# Patient Record
Sex: FEMALE | Race: WHITE | Employment: OTHER | URBAN - METROPOLITAN AREA
[De-identification: names, ages, dates, MRNs, and addresses within clinical notes are randomized per-mention and may not be internally consistent; named-entity substitution may affect disease eponyms.]

---

## 2017-01-10 ENCOUNTER — TRANSFERRED RECORDS (OUTPATIENT)
Dept: HEALTH INFORMATION MANAGEMENT | Facility: CLINIC | Age: 67
End: 2017-01-10

## 2017-01-10 LAB
ALT SERPL-CCNC: 28 U/L (ref 6–29)
AST SERPL-CCNC: 22 U/L (ref 10–35)
CHOLEST SERPL-MCNC: 240 MG/DL (ref 125–200)
CREAT SERPL-MCNC: 0.8 MG/DL (ref 0.5–0.99)
GFR SERPL CREATININE-BSD FRML MDRD: 77 ML/MIN/1.73M2
GLUCOSE SERPL-MCNC: 82 MG/DL (ref 65–99)
HDLC SERPL-MCNC: 83 MG/DL
LDLC SERPL CALC-MCNC: 128 MG/DL
NONHDLC SERPL-MCNC: 157 MG/DL
POTASSIUM SERPL-SCNC: 4.5 MMOL/L (ref 3.5–5.3)
TRIGL SERPL-MCNC: 143 MG/DL

## 2017-02-14 ENCOUNTER — OFFICE VISIT (OUTPATIENT)
Dept: FAMILY MEDICINE | Facility: CLINIC | Age: 67
End: 2017-02-14
Payer: COMMERCIAL

## 2017-02-14 VITALS
WEIGHT: 175 LBS | HEART RATE: 88 BPM | HEIGHT: 64 IN | BODY MASS INDEX: 29.88 KG/M2 | DIASTOLIC BLOOD PRESSURE: 64 MMHG | SYSTOLIC BLOOD PRESSURE: 124 MMHG | TEMPERATURE: 97.5 F | RESPIRATION RATE: 16 BRPM

## 2017-02-14 DIAGNOSIS — E78.2 MIXED HYPERLIPIDEMIA: ICD-10-CM

## 2017-02-14 DIAGNOSIS — N89.8 VAGINAL DISCHARGE: ICD-10-CM

## 2017-02-14 DIAGNOSIS — R30.9 PAIN WITH URINATION: ICD-10-CM

## 2017-02-14 DIAGNOSIS — N39.0 URINARY TRACT INFECTION WITHOUT HEMATURIA, SITE UNSPECIFIED: Primary | ICD-10-CM

## 2017-02-14 LAB
ALBUMIN UR-MCNC: NEGATIVE MG/DL
APPEARANCE UR: CLEAR
BACTERIA #/AREA URNS HPF: ABNORMAL /HPF
BILIRUB UR QL STRIP: NEGATIVE
COLOR UR AUTO: YELLOW
GLUCOSE UR STRIP-MCNC: NEGATIVE MG/DL
HGB UR QL STRIP: ABNORMAL
KETONES UR STRIP-MCNC: NEGATIVE MG/DL
LEUKOCYTE ESTERASE UR QL STRIP: ABNORMAL
MICRO REPORT STATUS: NORMAL
NITRATE UR QL: NEGATIVE
NON-SQ EPI CELLS #/AREA URNS LPF: ABNORMAL /LPF
PH UR STRIP: 7 PH (ref 5–7)
RBC #/AREA URNS AUTO: ABNORMAL /HPF (ref 0–2)
SP GR UR STRIP: 1.01 (ref 1–1.03)
SPECIMEN SOURCE: NORMAL
URN SPEC COLLECT METH UR: ABNORMAL
UROBILINOGEN UR STRIP-ACNC: 0.2 EU/DL (ref 0.2–1)
WBC #/AREA URNS AUTO: ABNORMAL /HPF (ref 0–2)
WET PREP SPEC: NORMAL

## 2017-02-14 PROCEDURE — 87210 SMEAR WET MOUNT SALINE/INK: CPT | Performed by: FAMILY MEDICINE

## 2017-02-14 PROCEDURE — 81001 URINALYSIS AUTO W/SCOPE: CPT | Performed by: FAMILY MEDICINE

## 2017-02-14 PROCEDURE — 99214 OFFICE O/P EST MOD 30 MIN: CPT | Performed by: FAMILY MEDICINE

## 2017-02-14 RX ORDER — CIPROFLOXACIN 500 MG/1
500 TABLET, FILM COATED ORAL 2 TIMES DAILY
Qty: 10 TABLET | Refills: 0 | Status: SHIPPED | OUTPATIENT
Start: 2017-02-14 | End: 2017-02-19

## 2017-02-14 NOTE — PROGRESS NOTES
SUBJECTIVE:                                                    Glory Rodriguez is a 66 year old female who presents to clinic today for the following health issues:      URINARY TRACT SYMPTOMS     Onset: Saturday    Description:   Painful urination (Dysuria): YES- pain at the end of voiding  Blood in urine (Hematuria): no   Delay in urine (Hesitency): no     Intensity: mild    Progression of Symptoms:  worsening    Accompanying Signs & Symptoms:  Fever/chills: no   Flank pain no   Nausea and vomiting: no   Any vaginal symptoms: vaginal itching  Abdominal/Pelvic Pain: no    History:   History of frequent UTI's: no   History of kidney stones: no   Sexually Active: YES  Possibility of pregnancy: No    Precipitating factors:   none         Therapies Tried and outcome:   Increase fluid intake         Problem list and histories reviewed & adjusted, as indicated.  Additional history: as documented    Patient Active Problem List   Diagnosis     Other and unspecified malignant neoplasm of scalp and skin of neck     Benign neoplasm of skin of other and unspecified parts of face     Mixed hyperlipidemia     Urinary frequency     HYPERLIPIDEMIA LDL GOAL <130     Past Surgical History   Procedure Laterality Date     Surgical history of -        s/p T & A     Surgical history of -   114869     D & C     Surgical history of -        Colonoscopy     Surgical history of -   10/72     Excision Bartholian Cyst, lt.       Social History   Substance Use Topics     Smoking status: Never Smoker     Smokeless tobacco: Never Used     Alcohol use Yes      Comment: occasionally     Family History   Problem Relation Age of Onset     Hypertension Mother      Hypertension Father      Prostate Cancer Father       of     Cancer - colorectal Brother      and rectal/ from     CANCER Sister      adrenal and then liver and lung/         Current Outpatient Prescriptions   Medication Sig Dispense Refill     VITAMIN D,  "CHOLECALCIFEROL, PO Take 2,000 Units by mouth daily       ciprofloxacin (CIPRO) 500 MG tablet Take 1 tablet (500 mg) by mouth 2 times daily for 5 days 10 tablet 0     simvastatin (ZOCOR) 20 MG tablet Take 1 tablet (20 mg) by mouth daily 90 tablet 3     Flaxseed, Linseed, 1000 MG CAPS Take  by mouth.       FISH OIL None Entered       MULTI-VITAMIN OR TABS None Entered       ASPIRIN 81 MG OR TABS 1 TABLET DAILY       CALCIUM + D OR 500mg bid       No Known Allergies  Recent Labs   Lab Test 01/07/14 02/28/12 02/02/11   LDL  117  116  93   HDL  87  90  80   TRIG  129  137  88   ALT  30  25  37   CR  0.89  0.81  0.87   GFRESTIMATED  69  78   --    GFRESTBLACK  81   --    --    POTASSIUM  5.0  4.6  5.0      BP Readings from Last 3 Encounters:   02/14/17 124/64   04/04/16 120/67   07/09/14 130/81    Wt Readings from Last 3 Encounters:   02/14/17 175 lb (79.4 kg)   04/04/16 172 lb (78 kg)   07/09/14 166 lb (75.3 kg)                    ROS:  Constitutional, HEENT, cardiovascular, pulmonary, gi and gu systems are negative, except as otherwise noted.    OBJECTIVE:                                                    /64 (BP Location: Right arm, Patient Position: Chair, Cuff Size: Adult Regular)  Pulse 88  Temp 97.5  F (36.4  C)  Resp 16  Ht 5' 4\" (1.626 m)  Wt 175 lb (79.4 kg)  BMI 30.04 kg/m2  Body mass index is 30.04 kg/(m^2).  GENERAL: healthy, alert and no distress  NECK: no adenopathy, no asymmetry, masses, or scars and thyroid normal to palpation  RESP: lungs clear to auscultation - no rales, rhonchi or wheezes  CV: regular rate and rhythm, normal S1 S2, no S3 or S4, no murmur, click or rub, no peripheral edema and peripheral pulses strong  ABDOMEN: soft, nontender, no hepatosplenomegaly, no masses and bowel sounds normal  MS: no gross musculoskeletal defects noted, no edema         ASSESSMENT/PLAN:                                                    ASSESSMENT / PLAN:  (N39.0) Urinary tract infection without " hematuria, site unspecified  (primary encounter diagnosis)  Comment: has been worsening with dysuria, will have her to try abx  Plan: ciprofloxacin (CIPRO) 500 MG tablet            (N89.8) Vaginal discharge  Comment: intermittent itching, wet prep is normal today  Plan: Wet prep, Urine Microscopic, CANCELED: *UA         reflex to Microscopic            (R30.9) Pain with urination  Comment: mentioned above   Plan: *UA reflex to Microscopic and Culture         ([facility] (except         [place] and [place])            (E78.2) Mixed hyperlipidemia  Comment: has been stable with current dose of meds, has lab results outside showed normal LDL and TG, will keep her on the current dose of meds, will refill as needed for her request . She may not need to RTC for med check at the next refill    Plan: mentioned above       Manuel Villatoro MD  [facility] [place]

## 2017-02-14 NOTE — NURSING NOTE
"Chief Complaint   Patient presents with     URI     Vaginal Problem       Initial /64 (BP Location: Right arm, Patient Position: Chair, Cuff Size: Adult Regular)  Pulse 88  Temp 97.5  F (36.4  C)  Resp 16  Ht 5' 4\" (1.626 m)  Wt 175 lb (79.4 kg)  BMI 30.04 kg/m2 Estimated body mass index is 30.04 kg/(m^2) as calculated from the following:    Height as of this encounter: 5' 4\" (1.626 m).    Weight as of this encounter: 175 lb (79.4 kg).  Medication Reconciliation: complete. JOANN Segal LPN      "

## 2017-02-14 NOTE — MR AVS SNAPSHOT
"              After Visit Summary   2017    Glory Rodriguez    MRN: [id number]           Patient Information     Date Of Birth          1950        Visit Information        Provider Department      2017 2:40 PM Manuel Villatoro MD [facility]        Today's Diagnoses     Urinary tract infection without hematuria, site unspecified    -  1    Vaginal discharge        Pain with urination        Mixed hyperlipidemia           Follow-ups after your visit        Who to contact     If you have questions or need follow up information about today's clinic visit or your schedule please contact [facility] directly at [phone].  Normal or non-critical lab and imaging results will be communicated to you by Fashionspacehart, letter or phone within 4 business days after the clinic has received the results. If you do not hear from us within 7 days, please contact the clinic through Fashionspacehart or phone. If you have a critical or abnormal lab result, we will notify you by phone as soon as possible.  Submit refill requests through Coin or call your pharmacy and they will forward the refill request to us. Please allow 3 business days for your refill to be completed.          Additional Information About Your Visit        MyChart Information     Coin lets you send messages to your doctor, view your test results, renew your prescriptions, schedule appointments and more. To sign up, go to [email or web address] . Click on \"Log in\" on the left side of the screen, which will take you to the Welcome page. Then click on \"Sign up Now\" on the right side of the page.     You will be asked to enter the access code listed below, as well as some personal information. Please follow the directions to create your username and password.     Your access code is: [email or web address]  Expires: 5/15/2017  3:42 PM     Your access code will  in 90 days. If you need help or a new code, please call your " "[facility] or [phone].        Care EveryWhere ID     This is your Care EveryWhere ID. This could be used by other organizations to access your [place] medical records  [id number]        Your Vitals Were     Pulse Temperature Respirations Height BMI (Body Mass Index)       88 97.5  F (36.4  C) 16 5' 4\" (1.626 m) 30.04 kg/m2        Blood Pressure from Last 3 Encounters:   02/14/17 124/64   04/04/16 120/67   07/09/14 130/81    Weight from Last 3 Encounters:   02/14/17 175 lb (79.4 kg)   04/04/16 172 lb (78 kg)   07/09/14 166 lb (75.3 kg)              We Performed the Following     *UA reflex to Microscopic and Culture ([facility] (except [place] and [place])     Urine Microscopic     Wet prep          Today's Medication Changes          These changes are accurate as of: 2/14/17  3:42 PM.  If you have any questions, ask your nurse or doctor.               Start taking these medicines.        Dose/Directions    ciprofloxacin 500 MG tablet   Commonly known as:  CIPRO   Used for:  Urinary tract infection without hematuria, site unspecified   Started by:  Manuel Villatoro MD        Dose:  500 mg   Take 1 tablet (500 mg) by mouth 2 times daily for 5 days   Quantity:  10 tablet   Refills:  0            Where to get your medicines      These medications were sent to [place] [address] [place] [address] [address]     Phone:  [phone]     ciprofloxacin 500 MG tablet                Primary Care Provider Office Phone # Fax #    Manuel Villatoro -[phone]       [address] [address]        Thank you!     Thank you for choosing [facility]  for your care. Our goal is always to provide you with excellent care. Hearing back from our patients is one way we can continue to improve our services. Please take a few minutes to complete the written survey that you may receive " in the mail after your visit with us. Thank you!             Your Updated Medication List - Protect others around you: Learn how to safely use, store and throw away your medicines at www.disposemymeds.org.          This list is accurate as of: 2/14/17  3:42 PM.  Always use your most recent med list.                   Brand Name Dispense Instructions for use    aspirin 81 MG tablet      1 TABLET DAILY       CALCIUM + D PO      500mg bid       ciprofloxacin 500 MG tablet    CIPRO    10 tablet    Take 1 tablet (500 mg) by mouth 2 times daily for 5 days       FISH OIL      None Entered       Flaxseed (Linseed) 1000 MG Caps      Take  by mouth.       Multi-vitamin Tabs tablet   Generic drug:  multivitamin, therapeutic with minerals      None Entered       simvastatin 20 MG tablet    ZOCOR    90 tablet    Take 1 tablet (20 mg) by mouth daily       VITAMIN D (CHOLECALCIFEROL) PO      Take 2,000 Units by mouth daily

## 2017-04-05 DIAGNOSIS — E78.2 MIXED HYPERLIPIDEMIA: Primary | ICD-10-CM

## 2017-04-05 NOTE — TELEPHONE ENCOUNTER
Simvastatin      Last Written Prescription Date: 4/8/16  Last Fill Quantity: 90, # refills: 3  Last Office Visit with [facility], [facility] or [facility] prescribing provider: 2/14/17       Lab Results   Component Value Date    CHOL 230 01/07/2014     Lab Results   Component Value Date    HDL 87 01/07/2014     Lab Results   Component Value Date     01/07/2014     Lab Results   Component Value Date    TRIG 129 01/07/2014     Lab Results   Component Value Date    CHOLHDLRATIO 2.8 05/24/2010

## 2017-04-06 RX ORDER — SIMVASTATIN 20 MG
TABLET ORAL
Qty: 90 TABLET | Refills: 3 | Status: SHIPPED | OUTPATIENT
Start: 2017-04-06 | End: 2018-04-07

## 2017-04-06 NOTE — TELEPHONE ENCOUNTER
Routing refill request to provider for review/approval because:  FLP has not been checked since 2014  Ana Washington RN   [facility] - Triage

## 2017-04-19 NOTE — TELEPHONE ENCOUNTER
Patient states that she had lab work done through her employer. She asked Dr. Villatoro at her LOV if the labs were what was needed for him to refill her medication.     She will bring over a copy this evening.     Informed patient that we will give copy to Dr. Villatoro. After Dr. Villatoro reviews, need to have scanned.     Carol Avendano RN

## 2017-04-20 NOTE — TELEPHONE ENCOUNTER
Med refilled.   Ana Washington RN   [facility] - Triage

## 2018-02-12 ENCOUNTER — TRANSFERRED RECORDS (OUTPATIENT)
Dept: HEALTH INFORMATION MANAGEMENT | Facility: CLINIC | Age: 68
End: 2018-02-12

## 2018-02-12 LAB
ALT SERPL-CCNC: 18 U/L (ref 6–29)
AST SERPL-CCNC: 16 U/L (ref 10–35)
CHOLEST SERPL-MCNC: 199 MG/DL
CREAT SERPL-MCNC: 0.85 MG/DL (ref 0.5–0.99)
GFR SERPL CREATININE-BSD FRML MDRD: 71 ML/MIN/1.73M2
GLUCOSE SERPL-MCNC: 96 MG/DL (ref 65–99)
HDLC SERPL-MCNC: 71 MG/DL
LDLC SERPL CALC-MCNC: 104 MG/DL
NONHDLC SERPL-MCNC: 128 MG/DL
POTASSIUM SERPL-SCNC: 4.6 MMOL/L (ref 3.5–5.3)
TRIGL SERPL-MCNC: 147 MG/DL

## 2018-02-13 ENCOUNTER — TELEPHONE (OUTPATIENT)
Dept: FAMILY MEDICINE | Facility: CLINIC | Age: 68
End: 2018-02-13

## 2018-05-06 DIAGNOSIS — E78.2 MIXED HYPERLIPIDEMIA: ICD-10-CM

## 2018-05-07 RX ORDER — SIMVASTATIN 20 MG
TABLET ORAL
Qty: 30 TABLET | Refills: 0 | Status: SHIPPED | OUTPATIENT
Start: 2018-05-07 | End: 2018-06-19

## 2018-05-07 NOTE — TELEPHONE ENCOUNTER
Routing refill request to provider for review/approval because:  Amy given x1 and patient did not follow up, please advise: patient was notified of need for appointment over the phone.  Labs not current:  Lipids  Patient needs to be seen because it has been more than 1 year since last office visit.  Matilda Mendoza RN - Triage  [facility]

## 2018-05-07 NOTE — TELEPHONE ENCOUNTER
"Requested Prescriptions   Pending Prescriptions Disp Refills     simvastatin (ZOCOR) 20 MG tablet [Pharmacy Med Name: SIMVASTATIN 20 MG TABLET]  Last Written Prescription Date:  4-9-2018  Last Fill Quantity: 30 tablet,  # refills: 0   Last office visit: 2/14/2017 with prescribing provider:  2-  Future Office Visit:     30 tablet 0     Sig: TAKE 1 TABLET BY MOUTH DAILY, NEEDS APPOINTMENT FOR FURTHER REFILLS    Statins Protocol Failed    5/6/2018  8:08 AM       Failed - LDL on file in past 12 months    Recent Labs   Lab Test 01/10/17   LDL  128            Failed - Recent (12 mo) or future (30 days) visit within the authorizing provider's specialty    Patient had office visit in the last 12 months or has a visit in the next 30 days with authorizing provider or within the authorizing provider's specialty.  See \"Patient Info\" tab in inbasket, or \"Choose Columns\" in Meds & Orders section of the refill encounter.           Passed - No abnormal creatine kinase in past 12 months    No lab results found.            Passed - Patient is age 18 or older       Passed - No active pregnancy on record       Passed - No positive pregnancy test in past 12 months            "

## 2018-06-05 ENCOUNTER — TRANSFERRED RECORDS (OUTPATIENT)
Dept: HEALTH INFORMATION MANAGEMENT | Facility: CLINIC | Age: 68
End: 2018-06-05

## 2018-06-18 ENCOUNTER — TRANSFERRED RECORDS (OUTPATIENT)
Dept: HEALTH INFORMATION MANAGEMENT | Facility: CLINIC | Age: 68
End: 2018-06-18

## 2018-06-19 ENCOUNTER — OFFICE VISIT (OUTPATIENT)
Dept: FAMILY MEDICINE | Facility: CLINIC | Age: 68
End: 2018-06-19
Payer: COMMERCIAL

## 2018-06-19 VITALS
BODY MASS INDEX: 28.34 KG/M2 | HEART RATE: 66 BPM | HEIGHT: 64 IN | OXYGEN SATURATION: 99 % | DIASTOLIC BLOOD PRESSURE: 81 MMHG | SYSTOLIC BLOOD PRESSURE: 129 MMHG | TEMPERATURE: 96.9 F | RESPIRATION RATE: 12 BRPM | WEIGHT: 166 LBS

## 2018-06-19 DIAGNOSIS — M25.521 RIGHT ELBOW PAIN: ICD-10-CM

## 2018-06-19 DIAGNOSIS — E78.2 MIXED HYPERLIPIDEMIA: Primary | ICD-10-CM

## 2018-06-19 PROCEDURE — 99214 OFFICE O/P EST MOD 30 MIN: CPT | Performed by: FAMILY MEDICINE

## 2018-06-19 RX ORDER — SIMVASTATIN 20 MG
TABLET ORAL
Qty: 90 TABLET | Refills: 3 | Status: SHIPPED | OUTPATIENT
Start: 2018-06-19 | End: 2019-06-18

## 2018-06-19 NOTE — PROGRESS NOTES
SUBJECTIVE:   Glory Rodriguez is a 68 year old female who presents to clinic today for the following health issues:      Hyperlipidemia Follow-Up      Rate your low fat/cholesterol diet?: fair    Taking statin?  Yes, no muscle aches from statin    Other lipid medications/supplements?:  none      Amount of exercise or physical activity: 2-3 days/week for an average of 30-45 minutes    Problems taking medications regularly: No    Medication side effects: none    Diet: regular (no restrictions)        ED/UC Followup:    Facility:  [address] [place] [address]   Date of visit: 18  Reason for visit: right elbow injury, fall   Current Status: needs to follow up, not sure what the next step is          Problem list and histories reviewed & adjusted, as indicated.  Additional history: as documented    Patient Active Problem List   Diagnosis     Other and unspecified malignant neoplasm of scalp and skin of neck     Benign neoplasm of skin of other and unspecified parts of face     Mixed hyperlipidemia     Urinary frequency     HYPERLIPIDEMIA LDL GOAL <130     Past Surgical History:   Procedure Laterality Date     SURGICAL HISTORY OF -       s/p T & A     SURGICAL HISTORY OF -   [id number]    D & C     SURGICAL HISTORY OF -       Colonoscopy     SURGICAL HISTORY OF -   10/72    Excision Bartholian Cyst, lt.       Social History   Substance Use Topics     Smoking status: Never Smoker     Smokeless tobacco: Never Used     Alcohol use Yes      Comment: occasionally     Family History   Problem Relation Age of Onset     Hypertension Mother      Hypertension Father      Prostate Cancer Father       of     Cancer - colorectal Brother      and rectal/ from     Cancer Sister      adrenal and then liver and lung/         Current Outpatient Prescriptions   Medication Sig Dispense Refill     ASPIRIN 81 MG OR TABS 1 TABLET DAILY       CALCIUM + D OR 500mg bid       FISH OIL None Entered       Flaxseed, Linseed, 1000 MG  "CAPS Take  by mouth.       MULTI-VITAMIN OR TABS None Entered       simvastatin (ZOCOR) 20 MG tablet TAKE 1 TABLET BY MOUTH DAILY, NEEDS APPOINTMENT FOR FURTHER REFILLS 90 tablet 3     VITAMIN D, CHOLECALCIFEROL, PO Take 2,000 Units by mouth daily       [DISCONTINUED] simvastatin (ZOCOR) 20 MG tablet TAKE 1 TABLET BY MOUTH DAILY, NEEDS APPOINTMENT FOR FURTHER REFILLS 30 tablet 0     No Known Allergies  Recent Labs   Lab Test 01/10/17 01/07/14 02/28/12   LDL  128  117  116   HDL  83  87  90   TRIG  143  129  137   ALT  28  30  25   CR  0.80  0.89  0.81   GFRESTIMATED  77  69  78   GFRESTBLACK  90  81   --    POTASSIUM  4.5  5.0  4.6      BP Readings from Last 3 Encounters:   06/19/18 129/81   02/14/17 124/64   04/04/16 120/67    Wt Readings from Last 3 Encounters:   06/19/18 166 lb (75.3 kg)   02/14/17 175 lb (79.4 kg)   04/04/16 172 lb (78 kg)               Reviewed and updated as needed this visit by clinical staff  Allergies       Reviewed and updated as needed this visit by Provider         ROS:  Constitutional, HEENT, cardiovascular, pulmonary, gi and gu systems are negative, except as otherwise noted.    OBJECTIVE:     /81 (Cuff Size: Adult Regular)  Pulse 66  Temp 96.9  F (36.1  C) (Tympanic)  Resp 12  Ht 5' 4\" (1.626 m)  Wt 166 lb (75.3 kg)  SpO2 99%  BMI 28.49 kg/m2  Body mass index is 28.49 kg/(m^2).  GENERAL: healthy, alert and no distress  NECK: no adenopathy, no asymmetry, masses, or scars and thyroid normal to palpation  RESP: lungs clear to auscultation - no rales, rhonchi or wheezes  CV: regular rate and rhythm, normal S1 S2, no S3 or S4, no murmur, click or rub, no peripheral edema and peripheral pulses strong  ABDOMEN: soft, nontender, no hepatosplenomegaly, no masses and bowel sounds normal  MS: no gross musculoskeletal defects noted, no edema        ASSESSMENT/PLAN:   ASSESSMENT / PLAN:  (E78.2) Mixed hyperlipidemia  (primary encounter diagnosis)  Comment: has been stable with " current dose of meds, has no side effect from it, the lab test with her worker[employer] reviewed and all are stable  Will have her to keep on current dose of meds,    Plan: simvastatin (ZOCOR) 20 MG tablet        Recheck in 1 year    (M25.521) Right elbow pain  Comment: had fall with contusion on the right elbow 2 weeks ago, XR reading showed no fracture, but ER note showed medial epicondyle fracture    Plan: encouraged her to keep doing conservative management.       FUTURE APPOINTMENTS:       - Follow-up visit in 1 year for JESUS Villatoro MD  [facility]

## 2018-06-19 NOTE — MR AVS SNAPSHOT
"              After Visit Summary   6/19/2018    Glory Rodriguez    MRN: [id number]           Patient Information     Date Of Birth          1950        Visit Information        Provider Department      6/19/2018 10:20 AM Manuel Villatoro MD [facility]        Today's Diagnoses     Mixed hyperlipidemia    -  1    Right elbow pain           Follow-ups after your visit        Follow-up notes from your care team     Return in about 1 year (around 6/19/2019) for Physical Exam.      Who to contact     If you have questions or need follow up information about today's clinic visit or your schedule please contact [facility] [place] directly at [phone].  Normal or non-critical lab and imaging results will be communicated to you by MyChart, letter or phone within 4 business days after the clinic has received the results. If you do not hear from us within 7 days, please contact the clinic through MyChart or phone. If you have a critical or abnormal lab result, we will notify you by phone as soon as possible.  Submit refill requests through Vivonet or call your pharmacy and they will forward the refill request to us. Please allow 3 business days for your refill to be completed.          Additional Information About Your Visit        Care EveryWhere ID     This is your Care EveryWhere ID. This could be used by other organizations to access your [facility] medical records  [id number]        Your Vitals Were     Pulse Temperature Respirations Height Pulse Oximetry BMI (Body Mass Index)    66 96.9  F (36.1  C) (Tympanic) 12 5' 4\" (1.626 m) 99% 28.49 kg/m2       Blood Pressure from Last 3 Encounters:   06/19/18 129/81   02/14/17 124/64   04/04/16 120/67    Weight from Last 3 Encounters:   06/19/18 166 lb (75.3 kg)   02/14/17 175 lb (79.4 kg)   04/04/16 172 lb (78 kg)              Today, you had the following     No orders found for display         Where to get your medicines      These " medications were sent to [facility] [address] [facility] - [place], [place] - [address]  [address] [address]     Phone:  [phone]     simvastatin 20 MG tablet          Primary Care Provider Office Phone # Fax #    Manuel Villatoro -[phone]       [address] [facility] [address]        Equal Access to Services     LINDA SALGUERO : Hadii aad ku hadasho Soomaali, waaxda luqadaha, qaybta kaalmada adeegyada, waxay idiin hayaan adeeg kharash la'aan ah. So [facility] [phone].    ATENCIÓN: Si habla español, tiene a mendoza disposición servicios gratuitos de asistencia lingüística. Llame al [phone].    We comply with applicable federal civil rights laws and [place] laws. We do not discriminate on the basis of race, color, national origin, age, disability, sex, sexual orientation, or gender identity.            Thank you!     Thank you for choosing [facility]  for your care. Our goal is always to provide you with excellent care. Hearing back from our patients is one way we can continue to improve our services. Please take a few minutes to complete the written survey that you may receive in the mail after your visit with us. Thank you!             Your Updated Medication List - Protect others around you: Learn how to safely use, store and throw away your medicines at www.disposemymeds.org.          This list is accurate as of 6/19/18 10:47 AM.  Always use your most recent med list.                   Brand Name Dispense Instructions for use Diagnosis    aspirin 81 MG tablet      1 TABLET DAILY        CALCIUM + D PO      500mg bid        FISH OIL      None Entered        Flaxseed (Linseed) 1000 MG Caps      Take  by mouth.        Multi-vitamin Tabs tablet   Generic drug:  multivitamin, therapeutic with minerals      None Entered        simvastatin 20 MG tablet    ZOCOR    90 tablet    TAKE 1 TABLET BY MOUTH DAILY, NEEDS APPOINTMENT FOR FURTHER REFILLS    Mixed  hyperlipidemia       VITAMIN D (CHOLECALCIFEROL) PO      Take 2,000 Units by mouth daily

## 2019-03-11 ENCOUNTER — TRANSFERRED RECORDS (OUTPATIENT)
Dept: HEALTH INFORMATION MANAGEMENT | Facility: CLINIC | Age: 69
End: 2019-03-11

## 2019-03-11 LAB
ALT SERPL-CCNC: 21 U/L (ref 6–29)
AST SERPL-CCNC: 18 U/L (ref 10–35)
CHOLEST SERPL-MCNC: 196 MG/DL
CREAT SERPL-MCNC: 0.82 MG/DL (ref 0.5–0.99)
GFR SERPL CREATININE-BSD FRML MDRD: 74 ML/MIN/1.73M2
GLUCOSE SERPL-MCNC: 89 MG/DL (ref 65–99)
HDLC SERPL-MCNC: 79 MG/DL
LDLC SERPL CALC-MCNC: 94 MG/DL
NONHDLC SERPL-MCNC: 117 MG/DL
POTASSIUM SERPL-SCNC: 4.2 MMOL/L (ref 3.5–5.3)
TRIGL SERPL-MCNC: 132 MG/DL

## 2019-06-25 ENCOUNTER — OFFICE VISIT (OUTPATIENT)
Dept: FAMILY MEDICINE | Facility: CLINIC | Age: 69
End: 2019-06-25
Payer: MEDICARE

## 2019-06-25 VITALS
HEIGHT: 64 IN | TEMPERATURE: 97.8 F | RESPIRATION RATE: 14 BRPM | BODY MASS INDEX: 29.53 KG/M2 | WEIGHT: 173 LBS | OXYGEN SATURATION: 100 % | DIASTOLIC BLOOD PRESSURE: 84 MMHG | SYSTOLIC BLOOD PRESSURE: 126 MMHG | HEART RATE: 66 BPM

## 2019-06-25 DIAGNOSIS — E78.2 MIXED HYPERLIPIDEMIA: ICD-10-CM

## 2019-06-25 PROCEDURE — 99213 OFFICE O/P EST LOW 20 MIN: CPT | Performed by: FAMILY MEDICINE

## 2019-06-25 RX ORDER — SIMVASTATIN 20 MG
TABLET ORAL
Qty: 90 TABLET | Refills: 3 | Status: SHIPPED | OUTPATIENT
Start: 2019-06-25 | End: 2020-07-13

## 2019-06-25 ASSESSMENT — MIFFLIN-ST. JEOR: SCORE: 1299.72

## 2019-06-25 NOTE — PROGRESS NOTES
Subjective     Glory Rodriguez is a 69 year old female who presents to clinic today for the following health issues:    HPI   Hyperlipidemia Follow-Up      Are you having any of the following symptoms? (Select all that apply)  No complaints of shortness of breath, chest pain or pressure.  No increased sweating or nausea with activity.  No left-sided neck or arm pain.  No complaints of pain in calves when walking 1-2 blocks.    Are you regularly taking any medication or supplement to lower your cholesterol?  YEs    Are you having muscle aches or other side effects that you think could be caused by your cholesterol lowering medication?  No        Amount of exercise or physical activity: 1 day/week for an average of less than 15 minutes    Problems taking medications regularly: No    Medication side effects: none    Diet: regular (no restrictions)      Patient Active Problem List   Diagnosis     Other and unspecified malignant neoplasm of scalp and skin of neck     Benign neoplasm of skin of other and unspecified parts of face     Mixed hyperlipidemia     Urinary frequency     HYPERLIPIDEMIA LDL GOAL <130     Past Surgical History:   Procedure Laterality Date     SURGICAL HISTORY OF -       s/p T & A     SURGICAL HISTORY OF -   857171    D & C     SURGICAL HISTORY OF -       Colonoscopy     SURGICAL HISTORY OF -   10/72    Excision Bartholian Cyst, lt.       Social History     Tobacco Use     Smoking status: Never Smoker     Smokeless tobacco: Never Used   Substance Use Topics     Alcohol use: Yes     Comment: occasionally     Family History   Problem Relation Age of Onset     Hypertension Mother      Hypertension Father      Prostate Cancer Father          of     Cancer - colorectal Brother         and rectal/ from     Cancer Sister         adrenal and then liver and lung/         Current Outpatient Medications   Medication Sig Dispense Refill     ASPIRIN 81 MG OR TABS 1 TABLET DAILY        "CALCIUM + D OR 500mg bid       FISH OIL None Entered       Flaxseed, Linseed, 1000 MG CAPS Take  by mouth.       MULTI-VITAMIN OR TABS None Entered       simvastatin (ZOCOR) 20 MG tablet TAKE 1 TABLET BY MOUTH DAILY. NEED APPOINTMENT FOR FURTHER REFILLS 90 tablet 3     VITAMIN D, CHOLECALCIFEROL, PO Take 2,000 Units by mouth daily       No Known Allergies  Recent Labs   Lab Test 02/12/18 01/10/17 01/07/14   * 128 117   HDL 71 83 87   TRIG 147 143 129   ALT 18 28 30   CR 0.85 0.80 0.89   GFRESTIMATED 71 77 69   GFRESTBLACK 83 90 81   POTASSIUM 4.6 4.5 5.0      BP Readings from Last 3 Encounters:   06/25/19 126/84   06/19/18 129/81   02/14/17 124/64    Wt Readings from Last 3 Encounters:   06/25/19 78.5 kg (173 lb)   06/19/18 75.3 kg (166 lb)   02/14/17 79.4 kg (175 lb)           Reviewed and updated as needed this visit by Provider         Review of Systems   ROS COMP: Constitutional, HEENT, cardiovascular, pulmonary, gi and gu systems are negative, except as otherwise noted.      Objective    /84 (Cuff Size: Adult Large)   Pulse 66   Temp 97.8  F (36.6  C) (Tympanic)   Resp 14   Ht 1.626 m (5' 4\")   Wt 78.5 kg (173 lb)   SpO2 100%   BMI 29.70 kg/m    Body mass index is 29.7 kg/m .  Physical Exam   GENERAL: healthy, alert and no distress  NECK: no adenopathy, no asymmetry, masses, or scars and thyroid normal to palpation  RESP: lungs clear to auscultation - no rales, rhonchi or wheezes  CV: regular rate and rhythm, normal S1 S2, no S3 or S4, no murmur, click or rub, no peripheral edema and peripheral pulses strong  ABDOMEN: soft, nontender, no hepatosplenomegaly, no masses and bowel sounds normal  MS: no gross musculoskeletal defects noted, no edema            Assessment & Plan       ICD-10-CM    1. Mixed hyperlipidemia E78.2 simvastatin (ZOCOR) 20 MG tablet   she has done a annual exam through her spouse's work, and results were reviewed, found no abnormal finding,   Will have her to keep taking " "current dose of meds, and keep working on life style modification for high BMI     BMI:   Estimated body mass index is 29.7 kg/m  as calculated from the following:    Height as of this encounter: 1.626 m (5' 4\").    Weight as of this encounter: 78.5 kg (173 lb).   Weight management plan: Discussed healthy diet and exercise guidelines        FUTURE APPOINTMENTS:       - Follow-up visit in 1 year      No follow-ups on file.    Manuel Villatoro MD  [facility]        "

## 2020-07-13 ENCOUNTER — VIRTUAL VISIT (OUTPATIENT)
Dept: FAMILY MEDICINE | Facility: CLINIC | Age: 70
End: 2020-07-13
Payer: COMMERCIAL

## 2020-07-13 DIAGNOSIS — E78.2 MIXED HYPERLIPIDEMIA: Primary | ICD-10-CM

## 2020-07-13 PROCEDURE — 99213 OFFICE O/P EST LOW 20 MIN: CPT | Mod: 95 | Performed by: FAMILY MEDICINE

## 2020-07-13 RX ORDER — MULTIVIT-MIN/IRON/FOLIC ACID/K 18-600-40
CAPSULE ORAL
COMMUNITY

## 2020-07-13 RX ORDER — SIMVASTATIN 20 MG
TABLET ORAL
Qty: 90 TABLET | Refills: 3 | Status: SHIPPED | OUTPATIENT
Start: 2020-07-13 | End: 2021-07-05

## 2020-07-13 NOTE — PROGRESS NOTES
"Glory Rodriguez is a 70 year old female who is being evaluated via a billable telephone visit.      The patient has been notified of following:     \"This telephone visit will be conducted via a call between you and your physician/provider. We have found that certain health care needs can be provided without the need for a physical exam.  This service lets us provide the care you need with a short phone conversation.  If a prescription is necessary we can send it directly to your pharmacy.  If lab work is needed we can place an order for that and you can then stop by our lab to have the test done at a later time.    Telephone visits are billed at different rates depending on your insurance coverage. During this emergency period, for some insurers they may be billed the same as an in-person visit.  Please reach out to your insurance provider with any questions.    If during the course of the call the physician/provider feels a telephone visit is not appropriate, you will not be charged for this service.\"    Patient has given verbal consent for Telephone visit?  Yes    What phone number would you like to be contacted at? [phone]    How would you like to obtain your AVS? Mail a copy    Subjective     Glory Rodriguez is a 70 year old female who presents via phone visit today for the following health issues:    HPI  Hyperlipidemia Follow-Up      Are you regularly taking any medication or supplement to lower your cholesterol?   Yes- Simvastatin    Are you having muscle aches or other side effects that you think could be caused by your cholesterol lowering medication?  No      How many servings of fruits and vegetables do you eat daily?  2-3    On average, how many sweetened beverages do you drink each day (Examples: soda, juice, sweet tea, etc.  Do NOT count diet or artificially sweetened beverages)?   2 -- creamer in coffee    How many days per week do you exercise enough to make your heart beat faster? " 2 - 1 mile    How many minutes a day do you exercise enough to make your heart beat faster? 9 or less    How many days per week do you miss taking your medication? 0        Patient Active Problem List   Diagnosis     Other and unspecified malignant neoplasm of scalp and skin of neck     Benign neoplasm of skin of other and unspecified parts of face     Mixed hyperlipidemia     Urinary frequency     HYPERLIPIDEMIA LDL GOAL <130     Past Surgical History:   Procedure Laterality Date     SURGICAL HISTORY OF -       s/p T & A     SURGICAL HISTORY OF -   1022    D & C     SURGICAL HISTORY OF -       Colonoscopy     SURGICAL HISTORY OF -   10/72    Excision Bartholian Cyst, lt.       Social History     Tobacco Use     Smoking status: Never Smoker     Smokeless tobacco: Never Used   Substance Use Topics     Alcohol use: Yes     Comment: occasionally     Family History   Problem Relation Age of Onset     Hypertension Mother      Hypertension Father      Prostate Cancer Father          of     Cancer - colorectal Brother         and rectal/ from     Cancer Sister         adrenal and then liver and lung/         Current Outpatient Medications   Medication Sig Dispense Refill     Ascorbic Acid (VITAMIN C) 500 MG CAPS        ASPIRIN 81 MG OR TABS 1 TABLET DAILY       CALCIUM + D OR 500mg bid       ELDERBERRY PO Take 1,000 mg by mouth       FISH OIL None Entered       Flaxseed, Linseed, 1000 MG CAPS Take  by mouth.       MULTI-VITAMIN OR TABS None Entered       simvastatin (ZOCOR) 20 MG tablet TAKE 1 TABLET BY MOUTH DAILY. NEED APPOINTMENT FOR FURTHER REFILLS 90 tablet 3     VITAMIN D, CHOLECALCIFEROL, PO Take 2,000 Units by mouth daily       No Known Allergies  Recent Labs   Lab Test 03/11/19 02/12/18 01/10/17   LDL 94 104* 128   HDL 79 71 83   TRIG 132 147 143   ALT 21 18 28   CR 0.82 0.85 0.80   GFRESTIMATED 74 71 77   GFRESTBLACK 85 83 90   POTASSIUM 4.2 4.6 4.5      BP Readings from Last 3 Encounters:    06/25/19 126/84   06/19/18 129/81   02/14/17 124/64    Wt Readings from Last 3 Encounters:   06/25/19 78.5 kg (173 lb)   06/19/18 75.3 kg (166 lb)   02/14/17 79.4 kg (175 lb)                    Reviewed and updated as needed this visit by Provider         Review of Systems   Constitutional, HEENT, cardiovascular, pulmonary, gi and gu systems are negative, except as otherwise noted.       Objective   Reported vitals:  There were no vitals taken for this visit.   healthy, alert and no distress  PSYCH: Alert and oriented times 3; coherent speech, normal   rate and volume, able to articulate logical thoughts, able   to abstract reason, no tangential thoughts, no hallucinations   or delusions  Her affect is normal  RESP: No cough, no audible wheezing, able to talk in full sentences  Remainder of exam unable to be completed due to telephone visits    Diagnostic Test Results:  Labs reviewed in Epic        Assessment/Plan:  1. Mixed hyperlipidemia  Stable with current dose of meds, has no side effect from the meds, will keep monitoring and recheck in 1 year   - CBC with platelets and differential; Future  - Comprehensive metabolic panel (BMP + Alb, Alk Phos, ALT, AST, Total. Bili, TP); Future  - Lipid panel reflex to direct LDL Fasting; Future  - simvastatin (ZOCOR) 20 MG tablet; TAKE 1 TABLET BY MOUTH DAILY. NEED APPOINTMENT FOR FURTHER REFILLS  Dispense: 90 tablet; Refill: 3    No follow-ups on file.      Phone call duration:  13 minutes    Manuel Villatoro MD

## 2020-09-11 ENCOUNTER — TRANSFERRED RECORDS (OUTPATIENT)
Dept: HEALTH INFORMATION MANAGEMENT | Facility: CLINIC | Age: 70
End: 2020-09-11

## 2020-09-11 ENCOUNTER — RECORDS - HEALTHEAST (OUTPATIENT)
Dept: ADMINISTRATIVE | Facility: OTHER | Age: 70
End: 2020-09-11

## 2020-09-11 LAB
ALT SERPL W/O P-5'-P-CCNC: 32 U/L (ref 14–63)
ALT SERPL-CCNC: 32 U/L (ref 14–63)
AST SERPL-CCNC: 23 U/L (ref 15–37)
AST SERPL-CCNC: 23 U/L (ref 15–37)
CHOLEST SERPL-MCNC: 238 MG/DL (ref 100–200)
CHOLEST SERPL-MCNC: 238 MG/DL (ref 100–200)
CREAT SERPL-MCNC: 0.91 MG/DL (ref 0.51–0.95)
CREAT SERPL-MCNC: 0.91 MG/DL (ref 0.51–0.95)
GFR ESTIMATE EXT - HISTORICAL: >60 ML/MIN/1.73M2 (ref 60–150)
GFR SERPL CREATININE-BSD FRML MDRD: >60 ML/MIN/1.73M 2 (ref 60–150)
GLUCOSE SERPL-MCNC: 98 MG/DL (ref 74–100)
HDLC SERPL-MCNC: 81 MG/DL (ref 45–60)
HDLC SERPL-MCNC: 81 MG/DL (ref 45–60)
LDLC SERPL CALC-MCNC: 137 MG/DL (ref 1–129)
LDLC SERPL CALC-MCNC: 137 MG/DL (ref 1–129)
NON HDL CHOL. (LDL+VLDL): 157 MG/DL (ref 0–130)
NONHDLC SERPL-MCNC: 157 MG/DL (ref 0–130)
POTASSIUM SERPL-SCNC: 4.3 MMOL/L (ref 3.5–5.1)
TRIGL SERPL-MCNC: 101 MG/DL (ref 35–150)
TRIGLYCERIDES (HISTORICAL CONVERSION): 101 MG/DL (ref 35–150)

## 2020-09-30 ENCOUNTER — RECORDS - HEALTHEAST (OUTPATIENT)
Dept: HEALTH INFORMATION MANAGEMENT | Facility: CLINIC | Age: 70
End: 2020-09-30

## 2021-04-04 ENCOUNTER — HEALTH MAINTENANCE LETTER (OUTPATIENT)
Age: 71
End: 2021-04-04

## 2021-05-30 ENCOUNTER — HEALTH MAINTENANCE LETTER (OUTPATIENT)
Age: 71
End: 2021-05-30

## 2021-06-18 ENCOUNTER — NURSE TRIAGE (OUTPATIENT)
Dept: FAMILY MEDICINE | Facility: CLINIC | Age: 71
End: 2021-06-18

## 2021-06-18 ENCOUNTER — OFFICE VISIT (OUTPATIENT)
Dept: FAMILY MEDICINE | Facility: CLINIC | Age: 71
End: 2021-06-18
Payer: COMMERCIAL

## 2021-06-18 VITALS
BODY MASS INDEX: 29.19 KG/M2 | WEIGHT: 171 LBS | DIASTOLIC BLOOD PRESSURE: 88 MMHG | HEART RATE: 64 BPM | SYSTOLIC BLOOD PRESSURE: 144 MMHG | HEIGHT: 64 IN | OXYGEN SATURATION: 98 % | TEMPERATURE: 97.8 F

## 2021-06-18 DIAGNOSIS — M77.8 TRICEPS TENDINITIS: ICD-10-CM

## 2021-06-18 DIAGNOSIS — M75.21 BICEPS TENDONITIS, RIGHT: Primary | ICD-10-CM

## 2021-06-18 PROCEDURE — 99213 OFFICE O/P EST LOW 20 MIN: CPT | Performed by: FAMILY MEDICINE

## 2021-06-18 ASSESSMENT — MIFFLIN-ST. JEOR: SCORE: 1280.65

## 2021-06-18 ASSESSMENT — PAIN SCALES - GENERAL: PAINLEVEL: NO PAIN (0)

## 2021-06-18 NOTE — PROGRESS NOTES
"    Assessment & Plan     Biceps tendonitis, right  Started after planting last week, has numbness and tingling on forearm and hand, has normal strength on hand and arm  Will have her to try NSAIDS and ice as needed for next 1-2 weeks     Triceps tendinitis  Mentioned above            BMI:   Estimated body mass index is 29.35 kg/m  as calculated from the following:    Height as of this encounter: 1.626 m (5' 4\").    Weight as of this encounter: 77.6 kg (171 lb).   Weight management plan: Discussed healthy diet and exercise guidelines    FUTURE APPOINTMENTS:       - Follow-up visit in 3 months for CPE    No follow-ups on file.    Manuel Villatoro MD  [facility] DARY Holder is a 71 year old who presents for the following health issues     HPI     Musculoskeletal problem/pain  Onset/Duration: since Sunday  Description  Location: hand, wrist and elbow - right  Joint Swelling: no  Redness: no  Pain: YES- numbness tingling  Warmth: no  Intensity:  moderate  Progression of Symptoms:  improving  Accompanying signs and symptoms:   Fevers: no  Numbness/tingling/weakness: YES  History  Trauma to the area: no  Recent illness:  no  Previous similar problem: no  Previous evaluation:  no  Precipitating or alleviating factors:  Aggravating factors include: none  Therapies tried and outcome: nothing        Review of Systems   Constitutional, HEENT, cardiovascular, pulmonary, gi and gu systems are negative, except as otherwise noted.      Objective    BP (!) 144/88   Pulse 64   Temp 97.8  F (36.6  C) (Tympanic)   Ht 1.626 m (5' 4\")   Wt 77.6 kg (171 lb)   SpO2 98%   BMI 29.35 kg/m    Body mass index is 29.35 kg/m .  Physical Exam   GENERAL: healthy, alert and no distress  NECK: no adenopathy, no asymmetry, masses, or scars and thyroid normal to palpation  RESP: lungs clear to auscultation - no rales, rhonchi or wheezes  CV: regular rate and rhythm, normal S1 S2, no S3 or S4, no murmur, click " or rub, no peripheral edema and peripheral pulses strong  ABDOMEN: soft, nontender, no hepatosplenomegaly, no masses and bowel sounds normal  MS: no gross musculoskeletal defects noted, no edema

## 2021-06-18 NOTE — TELEPHONE ENCOUNTER
"Call transferred from Central scheduling     Pt states that she has had tingling/pain in her RIGHT arm since Sunday 6-13-21.    Denies weakness or difficulty using arm. Denies dizziness, headache, changes in vision or any neurological symptoms. Denies any head or bodily injury.     Advised pt to be seen in clinic right away. Scheduled to see Dr. Villatoro at 11:20 to be evaluated. Pt  will drive her to appt. Pt agreeable to plan.       Reason for Disposition    Tingling (e.g., pins and needles) of the face, arm or leg on one side of the body, that is  present now (Exceptions: chronic/recurrent symptom lasting > 4 weeks or tingling from known cause, such as: bumped elbow, carpal tunnel syndrome, pinched nerve, frostbite)    Additional Information    Negative: Difficult to awaken or acting confused (e.g., disoriented, slurred speech)    Negative: New neurologic deficit that is present NOW, sudden onset of ANY of the following: * Weakness of the face, arm, or leg on one side of the body* Numbness of the face, arm, or leg on one side of the body* Loss of speech or garbled speech    Negative: Sounds like a life-threatening emergency to the triager    Negative: Confusion, disorientation, or hallucinations is the main symptom    Negative: Dizziness is the main symptom    Negative: Followed a head injury within last 3 days    Negative: Headache (with neurologic deficit)    Negative: Unable to urinate (or only a few drops) and bladder feels very full    Negative: Loss of control of bowel or bladder (i.e., incontinence) of new onset    Negative: Back pain with numbness (loss of sensation) in groin or rectal area    Negative: Patient sounds very sick or weak to the triager    Answer Assessment - Initial Assessment Questions  1. SYMPTOM: \"What is the main symptom you are concerned about?\" (e.g., weakness, numbness)      Tingling/pain in right arm since Sunday afternoon.  2. ONSET: \"When did this start?\" (minutes, hours, days; " "while sleeping)      Sunday afternoon.   3. LAST NORMAL: \"When was the last time you were normal (no symptoms)?\"      Rj morning  4. PATTERN \"Does this come and go, or has it been constant since it started?\"  \"Is it present now?\"      Constant, is having it now.   5. CARDIAC SYMPTOMS: \"Have you had any of the following symptoms: chest pain, difficulty breathing, palpitations?\"      no  6. NEUROLOGIC SYMPTOMS: \"Have you had any of the following symptoms: headache, dizziness, vision loss, double vision, changes in speech, unsteady on your feet?\"      no  7. OTHER SYMPTOMS: \"Do you have any other symptoms?\"      No  8. PREGNANCY: \"Is there any chance you are pregnant?\" \"When was your last menstrual period?\"      NA    Protocols used: NEUROLOGIC DEFICIT-A-[place]    Sameera Vanessa RN    "

## 2021-07-05 DIAGNOSIS — E78.2 MIXED HYPERLIPIDEMIA: ICD-10-CM

## 2021-07-05 RX ORDER — SIMVASTATIN 20 MG
TABLET ORAL
Qty: 90 TABLET | Refills: 0 | Status: SHIPPED | OUTPATIENT
Start: 2021-07-05 | End: 2021-09-28

## 2021-09-19 ENCOUNTER — HEALTH MAINTENANCE LETTER (OUTPATIENT)
Age: 71
End: 2021-09-19

## 2021-09-26 DIAGNOSIS — E78.2 MIXED HYPERLIPIDEMIA: ICD-10-CM

## 2021-09-26 NOTE — LETTER
October 13, 2021      Glory Rodriguez  [address] [address]        Dear Glory,       Our records indicates that it is time for you to be seen for an office visit with Dr. Villatoro.    Please call our office at [phone] to schedule an appointment for follow up .     Please disregard this notice if you have already made an appointment.    If you are no longer a [facility] patient; Please contact us and let us know that as well. You will also need to the let the pharmacy know the name of your current Provider so that they can send future request to them.       Sincerely,    [facility] [place] Staff

## 2021-09-28 RX ORDER — SIMVASTATIN 20 MG
TABLET ORAL
Qty: 30 TABLET | Refills: 0 | Status: SHIPPED | OUTPATIENT
Start: 2021-09-28 | End: 2021-10-29

## 2021-09-28 NOTE — TELEPHONE ENCOUNTER
Medication is being filled for 1 time refill only due to:  due for med check and labs.   Emily DAVID RN  EP Triage

## 2021-11-23 DIAGNOSIS — E78.2 MIXED HYPERLIPIDEMIA: ICD-10-CM

## 2021-11-23 NOTE — LETTER
December 1, 2021      Glory Rodriguez  [address] [address]        Dear Glory,       Our records indicates that it is time for you to be seen for an office visit with Dr. Villatoro.    Please call our office at [phone] to schedule an appointment for physical with fasting labs and med check.      Please disregard this notice if you have already made an appointment.    If you are no longer a [facility] patient; Please contact us and let us know that as well. You will also need to the let the pharmacy know the name of your current Provider so that they can send future request to them.       Sincerely,    [place] [facility] [place] Staff

## 2021-11-24 RX ORDER — SIMVASTATIN 20 MG
TABLET ORAL
Qty: 15 TABLET | Refills: 0 | Status: SHIPPED | OUTPATIENT
Start: 2021-11-24 | End: 2021-12-06

## 2021-11-24 NOTE — TELEPHONE ENCOUNTER
Routing refill request to provider for review/approval because:  Labs not current:  MARCIO DAVID RN  EP Triage

## 2021-12-05 DIAGNOSIS — E78.2 MIXED HYPERLIPIDEMIA: ICD-10-CM

## 2021-12-06 RX ORDER — SIMVASTATIN 20 MG
TABLET ORAL
Qty: 15 TABLET | Refills: 0 | Status: SHIPPED | OUTPATIENT
Start: 2021-12-06 | End: 2021-12-17

## 2021-12-15 DIAGNOSIS — E78.2 MIXED HYPERLIPIDEMIA: ICD-10-CM

## 2021-12-16 NOTE — TELEPHONE ENCOUNTER
Failed protocol.  please route to  team if patient needs an appointment     Lucrecia MADRIGALRN BSN  [facility]  [phone]

## 2021-12-17 RX ORDER — SIMVASTATIN 20 MG
TABLET ORAL
Qty: 15 TABLET | Refills: 0 | Status: SHIPPED | OUTPATIENT
Start: 2021-12-17 | End: 2021-12-31

## 2021-12-28 ENCOUNTER — TRANSFERRED RECORDS (OUTPATIENT)
Dept: HEALTH INFORMATION MANAGEMENT | Facility: CLINIC | Age: 71
End: 2021-12-28
Payer: COMMERCIAL

## 2021-12-28 LAB
ALT SERPL-CCNC: 20 U/L (ref 6–29)
AST SERPL-CCNC: 18 U/L (ref 10–35)
CHOLESTEROL (EXTERNAL): 235 MG/DL
CREATININE (EXTERNAL): 0.88 MG/DL (ref 0.6–0.93)
GFR ESTIMATED (EXTERNAL): 67 ML/MIN/1.73M2
GFR ESTIMATED (IF AFRICAN AMERICAN) (EXTERNAL): 77 ML/MIN/1.73M2
GLUCOSE (EXTERNAL): 85 MG/DL (ref 65–99)
HDLC SERPL-MCNC: 84 MG/DL
LDL CHOLESTEROL (EXTERNAL): 123 MG/DL
NON HDL CHOLESTEROL (EXTERNAL): 151 MG/DL
POTASSIUM (EXTERNAL): 4.6 MMOL/L (ref 3.5–5.3)
TRIGLYCERIDES (EXTERNAL): 168 MG/DL

## 2021-12-29 DIAGNOSIS — E78.2 MIXED HYPERLIPIDEMIA: ICD-10-CM

## 2021-12-30 NOTE — TELEPHONE ENCOUNTER
Failed protocol.  please route to  team if patient needs an appointment     Lucrecia MADRIGALRN BSN  [facility]  [phone]

## 2021-12-31 RX ORDER — SIMVASTATIN 20 MG
TABLET ORAL
Qty: 10 TABLET | Refills: 0 | Status: SHIPPED | OUTPATIENT
Start: 2021-12-31 | End: 2022-02-15

## 2022-02-15 DIAGNOSIS — E78.2 MIXED HYPERLIPIDEMIA: ICD-10-CM

## 2022-02-15 RX ORDER — SIMVASTATIN 20 MG
TABLET ORAL
Qty: 90 TABLET | Refills: 3 | Status: SHIPPED | OUTPATIENT
Start: 2022-02-15 | End: 2023-02-13

## 2022-02-16 NOTE — TELEPHONE ENCOUNTER
Patient Contact     Called pt and relayed Dr. Villatoro's message, see below. She stated her understanding.     Mara ALFONSO RN  [facility]

## 2022-02-17 ENCOUNTER — TELEPHONE (OUTPATIENT)
Dept: FAMILY MEDICINE | Facility: CLINIC | Age: 72
End: 2022-02-17
Payer: COMMERCIAL

## 2022-04-30 ENCOUNTER — HEALTH MAINTENANCE LETTER (OUTPATIENT)
Age: 72
End: 2022-04-30

## 2022-05-23 ENCOUNTER — TRANSFERRED RECORDS (OUTPATIENT)
Dept: HEALTH INFORMATION MANAGEMENT | Facility: CLINIC | Age: 72
End: 2022-05-23
Payer: COMMERCIAL

## 2022-11-20 ENCOUNTER — HEALTH MAINTENANCE LETTER (OUTPATIENT)
Age: 72
End: 2022-11-20

## 2023-04-12 ENCOUNTER — OFFICE VISIT (OUTPATIENT)
Dept: FAMILY MEDICINE | Facility: CLINIC | Age: 73
End: 2023-04-12
Payer: COMMERCIAL

## 2023-04-12 VITALS
WEIGHT: 169.5 LBS | TEMPERATURE: 97.2 F | HEIGHT: 65 IN | BODY MASS INDEX: 28.24 KG/M2 | DIASTOLIC BLOOD PRESSURE: 74 MMHG | RESPIRATION RATE: 16 BRPM | OXYGEN SATURATION: 99 % | SYSTOLIC BLOOD PRESSURE: 132 MMHG | HEART RATE: 73 BPM

## 2023-04-12 DIAGNOSIS — M81.0 AGE RELATED OSTEOPOROSIS, UNSPECIFIED PATHOLOGICAL FRACTURE PRESENCE: ICD-10-CM

## 2023-04-12 DIAGNOSIS — R51.9 TEMPORAL HEADACHE: ICD-10-CM

## 2023-04-12 DIAGNOSIS — Z00.01 ENCOUNTER FOR GENERAL ADULT MEDICAL EXAMINATION WITH ABNORMAL FINDINGS: Primary | ICD-10-CM

## 2023-04-12 DIAGNOSIS — E78.2 MIXED HYPERLIPIDEMIA: ICD-10-CM

## 2023-04-12 LAB
ALBUMIN SERPL BCG-MCNC: 4.3 G/DL (ref 3.5–5.2)
ALP SERPL-CCNC: 62 U/L (ref 35–104)
ALT SERPL W P-5'-P-CCNC: 23 U/L (ref 10–35)
ANION GAP SERPL CALCULATED.3IONS-SCNC: 14 MMOL/L (ref 7–15)
AST SERPL W P-5'-P-CCNC: 25 U/L (ref 10–35)
BILIRUB SERPL-MCNC: 0.6 MG/DL
BUN SERPL-MCNC: 14.9 MG/DL (ref 8–23)
CALCIUM SERPL-MCNC: 9.7 MG/DL (ref 8.8–10.2)
CHLORIDE SERPL-SCNC: 103 MMOL/L (ref 98–107)
CHOLEST SERPL-MCNC: 227 MG/DL
CREAT SERPL-MCNC: 0.96 MG/DL (ref 0.51–0.95)
DEPRECATED HCO3 PLAS-SCNC: 23 MMOL/L (ref 22–29)
ERYTHROCYTE [DISTWIDTH] IN BLOOD BY AUTOMATED COUNT: 12.3 % (ref 10–15)
GFR SERPL CREATININE-BSD FRML MDRD: 63 ML/MIN/1.73M2
GLUCOSE SERPL-MCNC: 97 MG/DL (ref 70–99)
HCT VFR BLD AUTO: 39.7 % (ref 35–47)
HDLC SERPL-MCNC: 93 MG/DL
HGB BLD-MCNC: 13.4 G/DL (ref 11.7–15.7)
LDLC SERPL CALC-MCNC: 116 MG/DL
MCH RBC QN AUTO: 30.7 PG (ref 26.5–33)
MCHC RBC AUTO-ENTMCNC: 33.8 G/DL (ref 31.5–36.5)
MCV RBC AUTO: 91 FL (ref 78–100)
NONHDLC SERPL-MCNC: 134 MG/DL
PLATELET # BLD AUTO: 186 10E3/UL (ref 150–450)
POTASSIUM SERPL-SCNC: 4.3 MMOL/L (ref 3.4–5.3)
PROT SERPL-MCNC: 7 G/DL (ref 6.4–8.3)
RBC # BLD AUTO: 4.37 10E6/UL (ref 3.8–5.2)
SODIUM SERPL-SCNC: 140 MMOL/L (ref 136–145)
TRIGL SERPL-MCNC: 90 MG/DL
WBC # BLD AUTO: 6.2 10E3/UL (ref 4–11)

## 2023-04-12 PROCEDURE — 80053 COMPREHEN METABOLIC PANEL: CPT | Performed by: FAMILY MEDICINE

## 2023-04-12 PROCEDURE — 85027 COMPLETE CBC AUTOMATED: CPT | Performed by: FAMILY MEDICINE

## 2023-04-12 PROCEDURE — G0438 PPPS, INITIAL VISIT: HCPCS | Performed by: FAMILY MEDICINE

## 2023-04-12 PROCEDURE — 36415 COLL VENOUS BLD VENIPUNCTURE: CPT | Performed by: FAMILY MEDICINE

## 2023-04-12 PROCEDURE — 80061 LIPID PANEL: CPT | Performed by: FAMILY MEDICINE

## 2023-04-12 RX ORDER — SIMVASTATIN 20 MG
20 TABLET ORAL DAILY
Qty: 90 TABLET | Refills: 3 | Status: SHIPPED | OUTPATIENT
Start: 2023-04-12 | End: 2024-03-28

## 2023-04-12 ASSESSMENT — ENCOUNTER SYMPTOMS
FEVER: 0
NERVOUS/ANXIOUS: 0
CHILLS: 0
EYE PAIN: 0
COUGH: 0
DYSURIA: 0
ABDOMINAL PAIN: 0
FREQUENCY: 0
CONSTIPATION: 0
HEADACHES: 0
BREAST MASS: 0
DIARRHEA: 0
DIZZINESS: 0
HEMATOCHEZIA: 0
HEMATURIA: 0
ARTHRALGIAS: 0
SORE THROAT: 0
NAUSEA: 0
MYALGIAS: 0
PALPITATIONS: 0
PARESTHESIAS: 0
SHORTNESS OF BREATH: 0
JOINT SWELLING: 0
HEARTBURN: 0

## 2023-04-12 ASSESSMENT — ACTIVITIES OF DAILY LIVING (ADL): CURRENT_FUNCTION: NO ASSISTANCE NEEDED

## 2023-04-12 ASSESSMENT — PAIN SCALES - GENERAL: PAINLEVEL: NO PAIN (0)

## 2023-04-12 NOTE — PATIENT INSTRUCTIONS
Patient Education   Personalized Prevention Plan  You are due for the preventive services outlined below.  Your care team is available to assist you in scheduling these services.  If you have already completed any of these items, please share that information with your care team to update in your medical record.  Health Maintenance Due   Topic Date Due     Zoster (Shingles) Vaccine (1 of 2) Never done     Annual Wellness Visit  02/18/2016     Pneumococcal Vaccine (1 - PCV) Never done     ANNUAL REVIEW OF HM ORDERS  06/18/2022     Diptheria Tetanus Pertussis (DTAP/TDAP/TD) Vaccine (3 - Td or Tdap) 07/25/2022     Flu Vaccine (1) 09/01/2022     COVID-19 Vaccine (5 - Booster for Pfizer series) 09/08/2022     Osteoporosis Screening  01/22/2023        Patient Education   Personalized Prevention Plan  You are due for the preventive services outlined below.  Your care team is available to assist you in scheduling these services.  If you have already completed any of these items, please share that information with your care team to update in your medical record.  Health Maintenance Due   Topic Date Due     Zoster (Shingles) Vaccine (1 of 2) Never done     Pneumococcal Vaccine (1 - PCV) Never done     ANNUAL REVIEW OF HM ORDERS  06/18/2022     Diptheria Tetanus Pertussis (DTAP/TDAP/TD) Vaccine (3 - Td or Tdap) 07/25/2022     Flu Vaccine (1) 09/01/2022     COVID-19 Vaccine (5 - Booster for Pfizer series) 09/08/2022     Osteoporosis Screening  01/22/2023

## 2023-04-12 NOTE — PROGRESS NOTES
"   SUBJECTIVE:   CC: Glory is an 73 year old who presents for preventive health visit.       4/12/2023     2:08 PM   Additional Questions   Roomed by Sonja Reyes LPN   {Split Bill scripting  The purpose of this visit is to discuss your medical history and prevent health problems before you are sick. You may be responsible for a co-pay, coinsurance, or deductible if your visit today includes services such as checking on a sore throat, having an x-ray or lab test, or treating and evaluating a new or existing condition :544867}  {Patient advised of split billing (Optional):911299}  Healthy Habits:     In general, how would you rate your overall health?  Good    Frequency of exercise:  2-3 days/week    Do you usually eat at least 4 servings of fruit and vegetables a day, include whole grains    & fiber and avoid regularly eating high fat or \"junk\" foods?  Yes    Taking medications regularly:  Yes    Medication side effects:  None    Ability to successfully perform activities of daily living:  No assistance needed    Home Safety:  No safety concerns identified    Hearing Impairment:  No hearing concerns    In the past 6 months, have you been bothered by leaking of urine?  No    In general, how would you rate your overall mental or emotional health?  Good      PHQ-2 Total Score: 0    Additional concerns today:  No    {Add if <65 person on Medicare  - Required Questions (Optional):774914}  {Outside tests to abstract? :965818}    {additional problems to add (Optional):401075}    Today's PHQ-2 Score:       4/12/2023     2:05 PM   PHQ-2 ( 1999 Pfizer)   Q1: Little interest or pleasure in doing things 0   Q2: Feeling down, depressed or hopeless 0   PHQ-2 Score 0   Q1: Little interest or pleasure in doing things Not at all   Q2: Feeling down, depressed or hopeless Not at all   PHQ-2 Score 0           Social History     Tobacco Use     Smoking status: Never     Smokeless tobacco: Never   Vaping Use     Vaping status: Not on " "file   Substance Use Topics     Alcohol use: Yes     Comment: occasionally     {Rooming staff  Click this link to complete the Prescreen if response below is not for today's visit  Alcohol Use Prescreen >3 drinks/day or > 7 drinks/week.  If the prescreen question answer is YES, complete the full AUDIT  :938771}        4/12/2023     2:04 PM   Alcohol Use   Prescreen: >3 drinks/day or >7 drinks/week? No   {add AUDIT responses (Optional) (A score of 7 for adult men is an indication of hazardous drinking; a score of 8 or more is an indication of an alcohol use disorder.  A score of 7 or more for adult women is an indication of hazardous drinking or an alchohol use disorder):038905}  Reviewed orders with patient.  Reviewed health maintenance and updated orders accordingly - { :156772::\"Yes\"}  {Chronicprobdata (optional):571124}    Breast Cancer Screening:        History of abnormal Pap smear: { :578170}      4/20/2011    12:00 AM 11/28/2007    12:00 AM 11/21/2006    12:00 AM   PAP / HPV   PAP (Historical) NIL   NIL   NIL       Reviewed and updated as needed this visit by clinical staff                  Reviewed and updated as needed this visit by Provider                 {HISTORY OPTIONS (Optional):278515}    Review of Systems   Constitutional: Negative for chills and fever.   HENT: Negative for congestion, ear pain, hearing loss and sore throat.    Eyes: Negative for pain and visual disturbance.   Respiratory: Negative for cough and shortness of breath.    Cardiovascular: Negative for chest pain, palpitations and peripheral edema.   Gastrointestinal: Negative for abdominal pain, constipation, diarrhea, heartburn, hematochezia and nausea.   Breasts:  Negative for tenderness, breast mass and discharge.   Genitourinary: Positive for urgency. Negative for dysuria, frequency, genital sores, hematuria, pelvic pain, vaginal bleeding and vaginal discharge.   Musculoskeletal: Negative for arthralgias, joint swelling and " "myalgias.   Skin: Negative for rash.   Neurological: Negative for dizziness, headaches and paresthesias.   Psychiatric/Behavioral: Negative for mood changes. The patient is not nervous/anxious.      {FEMALE ROS (Optional):983664}     OBJECTIVE:   There were no vitals taken for this visit.  Physical Exam  {Exam Choices (Optional):645431}    {Diagnostic Test Results (Optional):947199::\"Diagnostic Test Results:\",\"Labs reviewed in Epic\"}    ASSESSMENT/PLAN:   {Diag Picklist:171227}    {Patient advised of split billing (Optional):047227}      COUNSELING:  {FEMALE COUNSELING MESSAGES:571558::\"Reviewed preventive health counseling, as reflected in patient instructions\"}        She reports that she has never smoked. She has never used smokeless tobacco.      {Counseling Resources  [place] Preventive Services Task Force  Cholesterol Screening  Health diet/nutrition  Pooled Cohorts Equation Calculator  [employer]'s MyPlate  ASA Prophylaxis  Lung CA Screening  Osteoporosis prevention/bone health :072102}  {Breast Cancer Risk Calculator  BRCA-Related Cancer Risk Assessment FHS-7 Tool :342408}  Manuel Villatoro MD  [facility]  "

## 2023-04-12 NOTE — PROGRESS NOTES
"SUBJECTIVE:   Glory is a 73 year old who presents for Preventive Visit.      4/12/2023     2:08 PM   Additional Questions   Roomed by Sonja Reyes LPN     Patient has been advised of split billing requirements and indicates understanding: Yes  Are you in the first 12 months of your Medicare coverage?  Yes,     Healthy Habits:     In general, how would you rate your overall health?  Good    Frequency of exercise:  2-3 days/week    Do you usually eat at least 4 servings of fruit and vegetables a day, include whole grains    & fiber and avoid regularly eating high fat or \"junk\" foods?  Yes    Taking medications regularly:  Yes    Medication side effects:  None    Ability to successfully perform activities of daily living:  No assistance needed    Home Safety:  No safety concerns identified    Hearing Impairment:  No hearing concerns    In the past 6 months, have you been bothered by leaking of urine?  No    In general, how would you rate your overall mental or emotional health?  Good      PHQ-2 Total Score: 0    Additional concerns today:  No      Have you ever done Advance Care Planning? (For example, a Health Directive, POLST, or a discussion with a medical provider or your loved ones about your wishes): Yes, advance care planning is on file.       Fall risk    Fallen 2 or more times in the past year?: No  Any fall with injury in the past year?: No    Cognitive Screening   1) Repeat 3 items (Leader, Season, Table)    2) Clock draw: NORMAL  3) 3 item recall: Recalls 3 objects  Results: NORMAL clock, 1-2 items recalled: COGNITIVE IMPAIRMENT LESS LIKELY    Mini-CogTM Copyright WALTER Henry. Licensed by the author for use in [facility]; reprinted with permission (rufina@.[facility]). All rights reserved.      Do you have sleep apnea, excessive snoring or daytime drowsiness?: no    Reviewed and updated as needed this visit by clinical staff    Allergies  Meds              Reviewed and updated as needed this visit by " Provider                 Social History     Tobacco Use     Smoking status: Not on file     Smokeless tobacco: Not on file   Vaping Use     Vaping status: Not on file   Substance Use Topics     Alcohol use: Yes     Comment: occasionally             4/12/2023     2:04 PM   Alcohol Use   Prescreen: >3 drinks/day or >7 drinks/week? No          View : No data to display.              Do you have a current opioid prescription? No  Do you use any other controlled substances or medications that are not prescribed by a provider? None      Current providers sharing in care for this patient include:   Patient Care Team:  Manuel Villatoro MD as PCP - General (Family Practice)  Manuel Villatoro MD as Assigned PCP    The following health maintenance items are reviewed in Epic and correct as of today:  Health Maintenance   Topic Date Due     ZOSTER IMMUNIZATION (1 of 2) Never done     MEDICARE ANNUAL WELLNESS VISIT  02/18/2016     Pneumococcal Vaccine: 65+ Years (1 - PCV) Never done     ANNUAL REVIEW OF HM ORDERS  06/18/2022     DTAP/TDAP/TD IMMUNIZATION (3 - Td or Tdap) 07/25/2022     INFLUENZA VACCINE (1) 09/01/2022     COVID-19 Vaccine (5 - Booster for Pfizer series) 09/08/2022     DEXA  01/22/2023     ADVANCE CARE PLANNING  06/19/2023     MAMMO SCREENING  12/28/2023     FALL RISK ASSESSMENT  04/12/2024     COLORECTAL CANCER SCREENING  04/01/2025     LIPID  12/28/2026     PHQ-2 (once per calendar year)  Completed     IPV IMMUNIZATION  Aged Out     MENINGITIS IMMUNIZATION  Aged Out     HEPATITIS C SCREENING  Discontinued     BP Readings from Last 3 Encounters:   04/12/23 132/74   06/18/21 (!) 144/88   06/25/19 126/84    Wt Readings from Last 3 Encounters:   04/12/23 76.9 kg (169 lb 8 oz)   06/18/21 77.6 kg (171 lb)   06/25/19 78.5 kg (173 lb)                  Patient Active Problem List   Diagnosis     Other and unspecified malignant neoplasm of scalp and skin of neck     Benign neoplasm of skin of other and unspecified parts of face      Mixed hyperlipidemia     Urinary frequency     HYPERLIPIDEMIA LDL GOAL <130     Past Surgical History:   Procedure Laterality Date     SURGICAL HISTORY OF -       s/p T & A     SURGICAL HISTORY OF -   706753    D & C     SURGICAL HISTORY OF -       Colonoscopy     SURGICAL HISTORY OF -   10/72    Excision Bartholian Cyst, lt.       Social History     Tobacco Use     Smoking status: Not on file     Smokeless tobacco: Not on file   Vaping Use     Vaping status: Not on file   Substance Use Topics     Alcohol use: Yes     Comment: occasionally     Family History   Problem Relation Age of Onset     Hypertension Mother      Hypertension Father      Prostate Cancer Father          of     Cancer - colorectal Brother         and rectal/ from     Cancer Sister         adrenal and then liver and lung/         Current Outpatient Medications   Medication Sig Dispense Refill     Ascorbic Acid (VITAMIN C) 500 MG CAPS        ASPIRIN 81 MG OR TABS 1 TABLET DAILY       CALCIUM + D OR 500mg bid       ELDERBERRY PO Take 1,000 mg by mouth       FISH OIL None Entered       Flaxseed, Linseed, 1000 MG CAPS Take  by mouth.       MULTI-VITAMIN OR TABS None Entered       simvastatin (ZOCOR) 20 MG tablet Take 1 tablet (20 mg) by mouth daily 90 tablet 3     VITAMIN D, CHOLECALCIFEROL, PO Take 2,000 Units by mouth daily       No Known Allergies  Recent Labs   Lab Test 21  0700 20  0000 19  0000 18  0000   LDL  --  137*  137* 94 104*   HDL  --  81* 79 71   TRIG 168* 101 132 147   ALT  --  32  32 21 18   CR  --  0.91  0.91 0.82 0.85   GFRESTIMATED  --  >60  >60 74 71   GFRESTBLACK  --   --  85 83   POTASSIUM  --  4.3 4.2 4.6                Review of Systems   Constitutional: Negative for chills and fever.   HENT: Negative for congestion, ear pain, hearing loss and sore throat.    Eyes: Negative for pain and visual disturbance.   Respiratory: Negative for cough and shortness of breath.   "  Cardiovascular: Negative for chest pain, palpitations and peripheral edema.   Gastrointestinal: Negative for abdominal pain, constipation, diarrhea, heartburn, hematochezia and nausea.   Breasts:  Negative for tenderness, breast mass and discharge.   Genitourinary: Positive for urgency. Negative for dysuria, frequency, genital sores, hematuria, pelvic pain, vaginal bleeding and vaginal discharge.   Musculoskeletal: Negative for arthralgias, joint swelling and myalgias.   Skin: Negative for rash.   Neurological: Negative for dizziness, headaches and paresthesias.   Psychiatric/Behavioral: Negative for mood changes. The patient is not nervous/anxious.          OBJECTIVE:   /74 (BP Location: Right arm, Patient Position: Sitting, Cuff Size: Adult Regular)   Pulse 73   Temp 97.2  F (36.2  C) (Temporal)   Resp 16   Ht 1.645 m (5' 4.75\")   Wt 76.9 kg (169 lb 8 oz)   SpO2 99%   BMI 28.42 kg/m   Estimated body mass index is 28.42 kg/m  as calculated from the following:    Height as of this encounter: 1.645 m (5' 4.75\").    Weight as of this encounter: 76.9 kg (169 lb 8 oz).  Physical Exam  GENERAL: healthy, alert and no distress  EYES: Eyes grossly normal to inspection, PERRL and conjunctivae and sclerae normal  HENT: ear canals and TM's normal, nose and mouth without ulcers or lesions  NECK: no adenopathy, no asymmetry, masses, or scars and thyroid normal to palpation  RESP: lungs clear to auscultation - no rales, rhonchi or wheezes  CV: regular rate and rhythm, normal S1 S2, no S3 or S4, no murmur, click or rub, no peripheral edema and peripheral pulses strong  ABDOMEN: soft, nontender, no hepatosplenomegaly, no masses and bowel sounds normal  MS: no gross musculoskeletal defects noted, no edema  SKIN: no suspicious lesions or rashes  NEURO: Normal strength and tone, mentation intact and speech normal  BACK: no CVA tenderness, no paralumbar tenderness  PSYCH: mentation appears normal, affect " "normal/bright  LYMPH: no cervical, supraclavicular, axillary, or inguinal adenopathy        ASSESSMENT / PLAN:   (Z00.01) Encounter for general adult medical examination with abnormal findings  (primary encounter diagnosis)  Plan: CBC with platelets, Comprehensive metabolic         panel (BMP + Alb, Alk Phos, ALT, AST, Total.         Bili, TP), Lipid panel reflex to direct LDL         Fasting, DX Hip/Pelvis/Spine            (M81.0) Age related osteoporosis, unspecified pathological fracture presence  Plan: DX Hip/Pelvis/Spine            (E78.2) Mixed hyperlipidemia  Plan: simvastatin (ZOCOR) 20 MG tablet            (R51.9) Temporal headache  Comment: has been worsening with poor sleep quality and snoring, encouraged her to lower the pillow and monitor  If not improving, will have her to see sleep clinic for further evaluation   Plan: mentioned above          COUNSELING:  Reviewed preventive health counseling, as reflected in patient instructions      BMI:   Estimated body mass index is 28.42 kg/m  as calculated from the following:    Height as of this encounter: 1.645 m (5' 4.75\").    Weight as of this encounter: 76.9 kg (169 lb 8 oz).   Weight management plan: Discussed healthy diet and exercise guidelines      She has no history on file for tobacco use.      Appropriate preventive services were discussed with this patient, including applicable screening as appropriate for cardiovascular disease, diabetes, osteopenia/osteoporosis, and glaucoma.  As appropriate for age/gender, discussed screening for colorectal cancer, prostate cancer, breast cancer, and cervical cancer. Checklist reviewing preventive services available has been given to the patient.    Reviewed patients plan of care and provided an AVS. The Basic Care Plan (routine screening as documented in Health Maintenance) for Glory meets the Care Plan requirement. This Care Plan has been established and reviewed with the Patient.          Manuel Villatoro MD  M " [facility]    Identified Health Risks:    I have reviewed Opioid Use Disorder and Substance Use Disorder risk factors and made any needed referrals.

## 2023-04-19 ENCOUNTER — HOSPITAL ENCOUNTER (OUTPATIENT)
Dept: BONE DENSITY | Facility: CLINIC | Age: 73
Discharge: HOME | End: 2023-04-19
Attending: FAMILY MEDICINE | Admitting: FAMILY MEDICINE
Payer: COMMERCIAL

## 2023-04-19 DIAGNOSIS — Z00.01 ENCOUNTER FOR GENERAL ADULT MEDICAL EXAMINATION WITH ABNORMAL FINDINGS: ICD-10-CM

## 2023-04-19 DIAGNOSIS — M81.0 AGE RELATED OSTEOPOROSIS, UNSPECIFIED PATHOLOGICAL FRACTURE PRESENCE: ICD-10-CM

## 2023-04-19 PROCEDURE — 77080 DXA BONE DENSITY AXIAL: CPT

## 2023-05-15 NOTE — TELEPHONE ENCOUNTER
Routing refill request to provider for review/approval because:  Labs not current:  MARCIO DAVID RN  EP Triage         Standing/Walking/Toileting

## 2023-05-17 ENCOUNTER — NURSE TRIAGE (OUTPATIENT)
Dept: NURSING | Facility: CLINIC | Age: 73
End: 2023-05-17
Payer: COMMERCIAL
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
## 2023-05-18 ENCOUNTER — OFFICE VISIT (OUTPATIENT)
Dept: FAMILY MEDICINE | Facility: CLINIC | Age: 73
End: 2023-05-18
Payer: COMMERCIAL

## 2023-05-18 VITALS
HEART RATE: 76 BPM | HEIGHT: 65 IN | WEIGHT: 172 LBS | TEMPERATURE: 97 F | RESPIRATION RATE: 18 BRPM | BODY MASS INDEX: 28.66 KG/M2 | SYSTOLIC BLOOD PRESSURE: 130 MMHG | OXYGEN SATURATION: 99 % | DIASTOLIC BLOOD PRESSURE: 70 MMHG

## 2023-05-18 DIAGNOSIS — S46.811A STRAIN OF RIGHT TRAPEZIUS MUSCLE, INITIAL ENCOUNTER: Primary | ICD-10-CM

## 2023-05-18 PROCEDURE — 99213 OFFICE O/P EST LOW 20 MIN: CPT | Performed by: NURSE PRACTITIONER

## 2023-05-18 RX ORDER — TIZANIDINE 2 MG/1
2 TABLET ORAL
Qty: 20 TABLET | Refills: 0 | Status: SHIPPED | OUTPATIENT
Start: 2023-05-18 | End: 2023-07-11

## 2023-05-18 NOTE — PROGRESS NOTES
"  Assessment & Plan     Strain of right trapezius muscle, initial encounter  - tiZANidine (ZANAFLEX) 2 MG tablet  Dispense: 20 tablet; Refill: 0  - REVIEW OF HEALTH MAINTENANCE PROTOCOL ORDERS      Prescription drug management      Bhavna Tidwell, CNP  [facility]   Glory is a 73 year old, presenting for the following health issues:  Musculoskeletal Problem        5/18/2023    10:01 AM   Additional Questions   Roomed by Susan Clark CMA   Accompanied by Self     Musculoskeletal Problem    History of Present Illness       Reason for visit:  Pinched nerve ?  Symptom onset:  1-2 weeks ago  Symptoms include:  Sharp pain in neck to middle of back  Symptom intensity:  Moderate  Symptom progression:  Improving  Had these symptoms before:  No  What makes it worse:  Turning my head    She eats 0-1 servings of fruits and vegetables daily.She consumes 0 sweetened beverage(s) daily.She exercises with enough effort to increase her heart rate 9 or less minutes per day.  She exercises with enough effort to increase her heart rate 3 or less days per week.   She is taking medications regularly.     A couple weeks ago doing gardening and using wheelbarrow and tweaked neck. This week doing some gardening and pain the next day had trouble moving neck the next day.    Has used heat/cold. Ibuprofen helped but she doesn't like to take.       Review of Systems   Constitutional, HEENT, cardiovascular, pulmonary, GI, , musculoskeletal, neuro, skin, endocrine and psych systems are negative, except as otherwise noted.      Objective    /70   Pulse 76   Temp 97  F (36.1  C) (Tympanic)   Resp 18   Ht 1.645 m (5' 4.75\")   Wt 78 kg (172 lb)   SpO2 99%   BMI 28.84 kg/m    Body mass index is 28.84 kg/m .  Physical Exam   GENERAL: healthy, alert and no distress  MS: extremities normal- no gross deformities noted, tenderness paracervical right and into upper right trapezius.  normal " bilaterally.               BARRETT Doyle     [address] [address]  [email or web address]   Office: [phone]

## 2023-07-08 DIAGNOSIS — S46.811A STRAIN OF RIGHT TRAPEZIUS MUSCLE, INITIAL ENCOUNTER: ICD-10-CM

## 2023-07-10 NOTE — TELEPHONE ENCOUNTER
Routing refill request to provider for review/approval because:  Drug not on the [facility] refill protocol     Irene Mcknight RN  [facility]

## 2023-07-11 RX ORDER — TIZANIDINE 2 MG/1
TABLET ORAL
Qty: 20 TABLET | Refills: 0 | Status: SHIPPED | OUTPATIENT
Start: 2023-07-11

## 2024-02-28 ENCOUNTER — TRANSFERRED RECORDS (OUTPATIENT)
Dept: HEALTH INFORMATION MANAGEMENT | Facility: CLINIC | Age: 74
End: 2024-02-28
Payer: COMMERCIAL

## 2024-03-13 ENCOUNTER — PATIENT OUTREACH (OUTPATIENT)
Dept: CARE COORDINATION | Facility: CLINIC | Age: 74
End: 2024-03-13
Payer: COMMERCIAL

## 2024-03-28 DIAGNOSIS — E78.2 MIXED HYPERLIPIDEMIA: ICD-10-CM

## 2024-03-28 RX ORDER — SIMVASTATIN 20 MG
20 TABLET ORAL DAILY
Qty: 90 TABLET | Refills: 0 | Status: SHIPPED | OUTPATIENT
Start: 2024-03-28 | End: 2024-06-24

## 2024-04-13 ENCOUNTER — HEALTH MAINTENANCE LETTER (OUTPATIENT)
Age: 74
End: 2024-04-13

## 2024-06-22 ENCOUNTER — HEALTH MAINTENANCE LETTER (OUTPATIENT)
Age: 74
End: 2024-06-22

## 2024-06-23 DIAGNOSIS — E78.2 MIXED HYPERLIPIDEMIA: ICD-10-CM

## 2024-06-24 RX ORDER — SIMVASTATIN 20 MG
20 TABLET ORAL DAILY
Qty: 90 TABLET | Refills: 0 | Status: SHIPPED | OUTPATIENT
Start: 2024-06-24 | End: 2024-08-21

## 2024-06-27 NOTE — TELEPHONE ENCOUNTER
Buddy scheduled    Jennifer BROTHERS  Visit Facilitator     Closure 2 Information: This tab is for additional flaps and grafts, including complex repair and grafts and complex repair and flaps. You can also specify a different location for the additional defect, if the location is the same you do not need to select a new one. We will insert the automated text for the repair you select below just as we do for solitary flaps and grafts. Please note that at this time if you select a location with a different insurance zone you will need to override the ICD10 and CPT if appropriate.

## 2024-08-21 ENCOUNTER — OFFICE VISIT (OUTPATIENT)
Dept: FAMILY MEDICINE | Facility: CLINIC | Age: 74
End: 2024-08-21
Payer: COMMERCIAL

## 2024-08-21 VITALS
BODY MASS INDEX: 28.73 KG/M2 | RESPIRATION RATE: 16 BRPM | OXYGEN SATURATION: 100 % | SYSTOLIC BLOOD PRESSURE: 130 MMHG | DIASTOLIC BLOOD PRESSURE: 78 MMHG | WEIGHT: 168.3 LBS | HEART RATE: 75 BPM | HEIGHT: 64 IN | TEMPERATURE: 97.8 F

## 2024-08-21 DIAGNOSIS — Z23 NEED FOR SHINGLES VACCINE: ICD-10-CM

## 2024-08-21 DIAGNOSIS — Z12.31 VISIT FOR SCREENING MAMMOGRAM: ICD-10-CM

## 2024-08-21 DIAGNOSIS — E78.5 HYPERLIPIDEMIA LDL GOAL <130: ICD-10-CM

## 2024-08-21 DIAGNOSIS — Z29.11 NEED FOR VACCINATION AGAINST RESPIRATORY SYNCYTIAL VIRUS: ICD-10-CM

## 2024-08-21 DIAGNOSIS — E78.2 MIXED HYPERLIPIDEMIA: ICD-10-CM

## 2024-08-21 DIAGNOSIS — Z00.00 ENCOUNTER FOR MEDICARE ANNUAL WELLNESS EXAM: Primary | ICD-10-CM

## 2024-08-21 DIAGNOSIS — Z23 NEED FOR TDAP VACCINATION: ICD-10-CM

## 2024-08-21 LAB
ALBUMIN SERPL BCG-MCNC: 4.2 G/DL (ref 3.5–5.2)
ALP SERPL-CCNC: 61 U/L (ref 40–150)
ALT SERPL W P-5'-P-CCNC: 21 U/L (ref 0–50)
ANION GAP SERPL CALCULATED.3IONS-SCNC: 8 MMOL/L (ref 7–15)
AST SERPL W P-5'-P-CCNC: 26 U/L (ref 0–45)
BILIRUB SERPL-MCNC: 0.4 MG/DL
BUN SERPL-MCNC: 14.4 MG/DL (ref 8–23)
CALCIUM SERPL-MCNC: 9.4 MG/DL (ref 8.8–10.4)
CHLORIDE SERPL-SCNC: 104 MMOL/L (ref 98–107)
CHOLEST SERPL-MCNC: 209 MG/DL
CREAT SERPL-MCNC: 0.86 MG/DL (ref 0.51–0.95)
EGFRCR SERPLBLD CKD-EPI 2021: 71 ML/MIN/1.73M2
ERYTHROCYTE [DISTWIDTH] IN BLOOD BY AUTOMATED COUNT: 12.4 % (ref 10–15)
FASTING STATUS PATIENT QL REPORTED: YES
FASTING STATUS PATIENT QL REPORTED: YES
GLUCOSE SERPL-MCNC: 104 MG/DL (ref 70–99)
HCO3 SERPL-SCNC: 28 MMOL/L (ref 22–29)
HCT VFR BLD AUTO: 40.4 % (ref 35–47)
HDLC SERPL-MCNC: 80 MG/DL
HGB BLD-MCNC: 13.5 G/DL (ref 11.7–15.7)
LDLC SERPL CALC-MCNC: 108 MG/DL
MCH RBC QN AUTO: 30.6 PG (ref 26.5–33)
MCHC RBC AUTO-ENTMCNC: 33.4 G/DL (ref 31.5–36.5)
MCV RBC AUTO: 92 FL (ref 78–100)
NONHDLC SERPL-MCNC: 129 MG/DL
PLATELET # BLD AUTO: 184 10E3/UL (ref 150–450)
POTASSIUM SERPL-SCNC: 4.9 MMOL/L (ref 3.4–5.3)
PROT SERPL-MCNC: 6.9 G/DL (ref 6.4–8.3)
RBC # BLD AUTO: 4.41 10E6/UL (ref 3.8–5.2)
SODIUM SERPL-SCNC: 140 MMOL/L (ref 135–145)
TRIGL SERPL-MCNC: 107 MG/DL
WBC # BLD AUTO: 4.7 10E3/UL (ref 4–11)

## 2024-08-21 PROCEDURE — 80061 LIPID PANEL: CPT | Performed by: FAMILY MEDICINE

## 2024-08-21 PROCEDURE — 85027 COMPLETE CBC AUTOMATED: CPT | Performed by: FAMILY MEDICINE

## 2024-08-21 PROCEDURE — 99397 PER PM REEVAL EST PAT 65+ YR: CPT | Performed by: FAMILY MEDICINE

## 2024-08-21 PROCEDURE — 36415 COLL VENOUS BLD VENIPUNCTURE: CPT | Performed by: FAMILY MEDICINE

## 2024-08-21 PROCEDURE — 80053 COMPREHEN METABOLIC PANEL: CPT | Performed by: FAMILY MEDICINE

## 2024-08-21 RX ORDER — SIMVASTATIN 20 MG
20 TABLET ORAL DAILY
Qty: 90 TABLET | Refills: 3 | Status: SHIPPED | OUTPATIENT
Start: 2024-08-21

## 2024-08-21 RX ORDER — TOCOPHERSOLAN (VITAMIN E TPGS) 400/15ML
LIQUID (ML) ORAL
COMMUNITY

## 2024-08-21 SDOH — HEALTH STABILITY: PHYSICAL HEALTH: ON AVERAGE, HOW MANY MINUTES DO YOU ENGAGE IN EXERCISE AT THIS LEVEL?: 30 MIN

## 2024-08-21 SDOH — HEALTH STABILITY: PHYSICAL HEALTH: ON AVERAGE, HOW MANY DAYS PER WEEK DO YOU ENGAGE IN MODERATE TO STRENUOUS EXERCISE (LIKE A BRISK WALK)?: 3 DAYS

## 2024-08-21 ASSESSMENT — SOCIAL DETERMINANTS OF HEALTH (SDOH)
HOW OFTEN DO YOU GET TOGETHER WITH FRIENDS OR RELATIVES?: THREE TIMES A WEEK
HOW OFTEN DO YOU GET TOGETHER WITH FRIENDS OR RELATIVES?: THREE TIMES A WEEK

## 2024-08-21 ASSESSMENT — PAIN SCALES - GENERAL: PAINLEVEL: NO PAIN (0)

## 2024-08-21 NOTE — PROGRESS NOTES
"Preventive Care Visit  [facility] DARY Villatoro MD, Family Medicine  Aug 21, 2024      Assessment & Plan     Need for shingles vaccine  Encouraged her to consider getting from Pharmacy     Need for Tdap vaccination  Encouraged her to consider getting from Pharmacy    Need for vaccination against respiratory syncytial virus  Encouraged her to consider getting from Pharmacy    Visit for screening mammogram    - MA Screening Bilateral w/ Samson; Future    Hyperlipidemia LDL goal <130    - Lipid panel reflex to direct LDL Non-fasting; Future    Encounter for Medicare annual wellness exam    - MA Screening Bilateral w/ Samson; Future  - Lipid panel reflex to direct LDL Non-fasting; Future  - CBC with platelets; Future  - Comprehensive metabolic panel (BMP + Alb, Alk Phos, ALT, AST, Total. Bili, TP); Future    Mixed hyperlipidemia    - simvastatin (ZOCOR) 20 MG tablet; Take 1 tablet (20 mg) by mouth daily.    Patient has been advised of split billing requirements and indicates understanding: Yes        BMI  Estimated body mass index is 29.09 kg/m  as calculated from the following:    Height as of this encounter: 1.62 m (5' 3.78\").    Weight as of this encounter: 76.3 kg (168 lb 4.8 oz).   Weight management plan: Discussed healthy diet and exercise guidelines    Counseling  Appropriate preventive services were addressed with this patient via screening, questionnaire, or discussion as appropriate for fall prevention, nutrition, physical activity, Tobacco-use cessation, social engagement, weight loss and cognition.  Checklist reviewing preventive services available has been given to the patient.  Reviewed patient's diet, addressing concerns and/or questions.   She is at risk for lack of exercise and has been provided with information to increase physical activity for the benefit of her well-being.   She is at risk for psychosocial distress and has been provided with information to reduce risk. "   Information on urinary incontinence and treatment options given to patient.       FUTURE APPOINTMENTS:       - Follow-up visit in 1 year     Supa Holder is a 74 year old, presenting for the following:  Physical (Fasting. )        8/21/2024     9:51 AM   Additional Questions   Roomed by Angélica GOSS         Health Care Directive  Patient does not have a Health Care Directive or Living Will: Patient states has Advance Directive and will bring in a copy to clinic.    HPI        8/21/2024   General Health   How would you rate your overall physical health? Good   Feel stress (tense, anxious, or unable to sleep) Only a little      (!) STRESS CONCERN      8/21/2024   Nutrition   Diet: Regular (no restrictions)            8/21/2024   Exercise   Days per week of moderate/strenous exercise 3 days   Average minutes spent exercising at this level 30 min            8/21/2024   Social Factors   Frequency of gathering with friends or relatives Three times a week   Worry food won't last until get money to buy more No   Food not last or not have enough money for food? No   Do you have housing? (Housing is defined as stable permanent housing and does not include staying ouside in a car, in a tent, in an abandoned building, in an overnight shelter, or couch-surfing.) Yes   Are you worried about losing your housing? No   Lack of transportation? No   Unable to get utilities (heat,electricity)? No            8/21/2024   Fall Risk   Fallen 2 or more times in the past year? No    No   Trouble with walking or balance? No    No       Multiple values from one day are sorted in reverse-chronological order          8/21/2024   Activities of Daily Living- Home Safety   Needs help with the following daily activites None of the above   Safety concerns in the home None of the above            8/21/2024   Dental   Dentist two times every year? Yes            8/21/2024   Hearing Screening   Hearing concerns? None of the above             8/21/2024   Driving Risk Screening   Patient/family members have concerns about driving No            8/21/2024   General Alertness/Fatigue Screening   Have you been more tired than usual lately? No            8/21/2024   Urinary Incontinence Screening   Bothered by leaking urine in past 6 months Yes            8/21/2024   TB Screening   Were you born outside of the [place]? No            Today's PHQ-2 Score:       8/21/2024     9:40 AM   PHQ-2 ( 1999 [employer])   Q1: Little interest or pleasure in doing things 0   Q2: Feeling down, depressed or hopeless 0   PHQ-2 Score 0   Q1: Little interest or pleasure in doing things Not at all   Q2: Feeling down, depressed or hopeless Not at all   PHQ-2 Score 0           8/21/2024   Substance Use   Alcohol more than 3/day or more than 7/wk No   Do you have a current opioid prescription? No   How severe/bad is pain from 1 to 10? 0/10 (No Pain)   Do you use any other substances recreationally? No        Social History     Tobacco Use    Smoking status: Never    Smokeless tobacco: Never   Vaping Use    Vaping status: Never Used   Substance Use Topics    Alcohol use: Yes     Comment: occasionally    Drug use: No          Mammogram Screening - Mammogram every 1-2 years updated in Health Maintenance based on mutual decision making    ASCVD Risk   The 10-year ASCVD risk score (Win SIMPSON, et al., 2019) is: 17.3%    Values used to calculate the score:      Age: 74 years      Sex: Female      Is Non- : No      Diabetic: No      Tobacco smoker: No      Systolic Blood Pressure: 151 mmHg      Is BP treated: No      HDL Cholesterol: 93 mg/dL      Total Cholesterol: 227 mg/dL            Reviewed and updated as needed this visit by Provider                    No past medical history on file.  Past Surgical History:   Procedure Laterality Date    SURGICAL HISTORY OF -       s/p T & A    SURGICAL HISTORY OF -   210515    D & C    SURGICAL HISTORY OF -   5/96     Colonoscopy    SURGICAL HISTORY OF -   10/72    Excision Bartholian Cyst, lt.     Labs reviewed in EPIC  BP Readings from Last 3 Encounters:   24 130/78   23 130/70   23 132/74    Wt Readings from Last 3 Encounters:   24 76.3 kg (168 lb 4.8 oz)   23 78 kg (172 lb)   23 76.9 kg (169 lb 8 oz)                  Patient Active Problem List   Diagnosis    Other and unspecified malignant neoplasm of scalp and skin of neck    Benign neoplasm of skin of other and unspecified parts of face    Mixed hyperlipidemia    Urinary frequency    HYPERLIPIDEMIA LDL GOAL <130     Past Surgical History:   Procedure Laterality Date    SURGICAL HISTORY OF -       s/p T & A    SURGICAL HISTORY OF -   1022    D & C    SURGICAL HISTORY OF -       Colonoscopy    SURGICAL HISTORY OF -   10/72    Excision Bartholian Cyst, lt.       Social History     Tobacco Use    Smoking status: Never    Smokeless tobacco: Never   Substance Use Topics    Alcohol use: Yes     Comment: occasionally     Family History   Problem Relation Age of Onset    Hypertension Mother     Hypertension Father     Prostate Cancer Father          of    Cancer - colorectal Brother         and rectal/ from    Cancer Sister         adrenal and then liver and lung/         Current Outpatient Medications   Medication Sig Dispense Refill    Ascorbic Acid (VITAMIN C) 500 MG CAPS       ASPIRIN 81 MG OR TABS 1 TABLET DAILY      CALCIUM + D OR Take 600 mg by mouth daily      Calcium-Magnesium-Vitamin D 500-250-200 MG-MG-UNIT TABS Take by mouth.      ELDERBERRY PO Take 100 mg by mouth daily      FISH OIL None Entered      Flaxseed, Linseed, 1000 MG CAPS Take  by mouth.      MULTI-VITAMIN OR TABS None Entered      simvastatin (ZOCOR) 20 MG tablet Take 1 tablet (20 mg) by mouth daily. 90 tablet 3    tiZANidine (ZANAFLEX) 2 MG tablet TAKE 1 TABLET (2 MG) BY MOUTH NIGHTLY AS NEEDED FOR MUSCLE SPASM 20 tablet 0    VITAMIN D, CHOLECALCIFEROL,  "PO Take 2,000 Units by mouth daily       No Known Allergies  Recent Labs   Lab Test 04/12/23  1513 12/28/21  0700 09/11/20  0000 03/11/19  0000 02/12/18  0000   *  --  137*  137* 94 104*   HDL 93  --  81* 79 71   TRIG 90 168* 101 132 147   ALT 23  --  32  32 21 18   CR 0.96*  --  0.91  0.91 0.82 0.85   GFRESTIMATED 63  --  >60  >60 74 71   GFRESTBLACK  --   --   --  85 83   POTASSIUM 4.3  --  4.3 4.2 4.6      Current providers sharing in care for this patient include:  Patient Care Team:  Manuel Villatoro MD as PCP - General (Family Practice)  Manuel Villatoro MD as Assigned PCP    The following health maintenance items are reviewed in Epic and correct as of today:  Health Maintenance   Topic Date Due    ZOSTER IMMUNIZATION (1 of 2) Never done    RSV VACCINE (Pregnancy & 60+) (1 - 1-dose 60+ series) Never done    Pneumococcal Vaccine: 65+ Years (1 of 1 - PCV) Never done    DTAP/TDAP/TD IMMUNIZATION (3 - Td or Tdap) 07/25/2022    COVID-19 Vaccine (5 - 2023-24 season) 09/01/2023    MAMMO SCREENING  12/28/2023    MEDICARE ANNUAL WELLNESS VISIT  04/12/2024    LIPID  04/12/2024    ANNUAL REVIEW OF HM ORDERS  05/18/2024    INFLUENZA VACCINE (1) 09/01/2024    COLORECTAL CANCER SCREENING  04/01/2025    FALL RISK ASSESSMENT  08/21/2025    GLUCOSE  04/12/2026    ADVANCE CARE PLANNING  04/12/2028    DEXA  04/19/2038    PHQ-2 (once per calendar year)  Completed    HPV IMMUNIZATION  Aged Out    MENINGITIS IMMUNIZATION  Aged Out    RSV MONOCLONAL ANTIBODY  Aged Out    HEPATITIS C SCREENING  Discontinued         Review of Systems  Constitutional, HEENT, cardiovascular, pulmonary, GI, , musculoskeletal, neuro, skin, endocrine and psych systems are negative, except as otherwise noted.     Objective    Exam  /78   Pulse 75   Temp 97.8  F (36.6  C) (Tympanic)   Resp 16   Ht 1.62 m (5' 3.78\")   Wt 76.3 kg (168 lb 4.8 oz)   SpO2 100%   BMI 29.09 kg/m     Estimated body mass index is 29.09 kg/m  as calculated from the " "following:    Height as of this encounter: 1.62 m (5' 3.78\").    Weight as of this encounter: 76.3 kg (168 lb 4.8 oz).    Physical Exam  GENERAL: alert and no distress  EYES: Eyes grossly normal to inspection, PERRL and conjunctivae and sclerae normal  HENT: ear canals and TM's normal, nose and mouth without ulcers or lesions  NECK: no adenopathy, no asymmetry, masses, or scars  RESP: lungs clear to auscultation - no rales, rhonchi or wheezes  CV: regular rate and rhythm, normal S1 S2, no S3 or S4, no murmur, click or rub, no peripheral edema  ABDOMEN: soft, nontender, no hepatosplenomegaly, no masses and bowel sounds normal  MS: no gross musculoskeletal defects noted, no edema  SKIN: no suspicious lesions or rashes  NEURO: Normal strength and tone, mentation intact and speech normal  BACK: no CVA tenderness, no paralumbar tenderness  PSYCH: mentation appears normal, affect normal/bright         8/21/2024   Mini Cog   Clock Draw Score 2 Normal   3 Item Recall 3 objects recalled   Mini Cog Total Score 5                 Signed Electronically by: Manuel Villatoro MD    "

## 2024-08-21 NOTE — PATIENT INSTRUCTIONS
Patient Education   Preventive Care Advice   This is general advice given by our system to help you stay healthy. However, your care team may have specific advice just for you. Please talk to your care team about your preventive care needs.  Nutrition  Eat 5 or more servings of fruits and vegetables each day.  Try wheat bread, brown rice and whole grain pasta (instead of white bread, rice, and pasta).  Get enough calcium and vitamin D. Check the label on foods and aim for 100% of the RDA (recommended daily allowance).  Lifestyle  Exercise at least 150 minutes each week  (30 minutes a day, 5 days a week).  Do muscle strengthening activities 2 days a week. These help control your weight and prevent disease.  No smoking.  Wear sunscreen to prevent skin cancer.  Have a dental exam and cleaning every 6 months.  Yearly exams  See your health care team every year to talk about:  Any changes in your health.  Any medicines your care team has prescribed.  Preventive care, family planning, and ways to prevent chronic diseases.  Shots (vaccines)   HPV shots (up to age 26), if you've never had them before.  Hepatitis B shots (up to age 59), if you've never had them before.  COVID-19 shot: Get this shot when it's due.  Flu shot: Get a flu shot every year.  Tetanus shot: Get a tetanus shot every 10 years.  Pneumococcal, hepatitis A, and RSV shots: Ask your care team if you need these based on your risk.  Shingles shot (for age 50 and up)  General health tests  Diabetes screening:  Starting at age 35, Get screened for diabetes at least every 3 years.  If you are younger than age 35, ask your care team if you should be screened for diabetes.  Cholesterol test: At age 39, start having a cholesterol test every 5 years, or more often if advised.  Bone density scan (DEXA): At age 50, ask your care team if you should have this scan for osteoporosis (brittle bones).  Hepatitis C: Get tested at least once in your life.  STIs (sexually  transmitted infections)  Before age 24: Ask your care team if you should be screened for STIs.  After age 24: Get screened for STIs if you're at risk. You are at risk for STIs (including HIV) if:  You are sexually active with more than one person.  You don't use condoms every time.  You or a partner was diagnosed with a sexually transmitted infection.  If you are at risk for HIV, ask about PrEP medicine to prevent HIV.  Get tested for HIV at least once in your life, whether you are at risk for HIV or not.  Cancer screening tests  Cervical cancer screening: If you have a cervix, begin getting regular cervical cancer screening tests starting at age 21.  Breast cancer scan (mammogram): If you've ever had breasts, begin having regular mammograms starting at age 40. This is a scan to check for breast cancer.  Colon cancer screening: It is important to start screening for colon cancer at age 45.  Have a colonoscopy test every 10 years (or more often if you're at risk) Or, ask your provider about stool tests like a FIT test every year or Cologuard test every 3 years.  To learn more about your testing options, visit:   .  For help making a decision, visit:   https://bit.ly/bw33307.  Prostate cancer screening test: If you have a prostate, ask your care team if a prostate cancer screening test (PSA) at age 55 is right for you.  Lung cancer screening: If you are a current or former smoker ages 50 to 80, ask your care team if ongoing lung cancer screenings are right for you.  For informational purposes only. Not to replace the advice of your health care provider. Copyright   2023 [facility] [employer]. All rights reserved. Clinically reviewed by the [facility] Transitions Program. [employer] [id number] - REV 01/24.  Bladder Training: Care Instructions  Your Care Instructions     Bladder training is used to treat urge incontinence and stress incontinence. Urge incontinence means that the need to urinate comes on so fast  that you can't get to a toilet in time. Stress incontinence means that you leak urine because of pressure on your bladder. For example, it may happen when you laugh, cough, or lift something heavy.  Bladder training can increase how long you can wait before you have to urinate. It can also help your bladder hold more urine. And it can give you better control over the urge to urinate.  It is important to remember that bladder training takes a few weeks to a few months to make a difference. You may not see results right away, but don't give up.  Follow-up care is a key part of your treatment and safety. Be sure to make and go to all appointments, and call your doctor if you are having problems. It's also a good idea to know your test results and keep a list of the medicines you take.  How can you care for yourself at home?  Work with your doctor to come up with a bladder training program that is right for you. You may use one or more of the following methods.  Delayed urination  In the beginning, try to keep from urinating for 5 minutes after you first feel the need to go.  While you wait, take deep, slow breaths to relax. Kegel exercises can also help you delay the need to go to the bathroom.  After some practice, when you can easily wait 5 minutes to urinate, try to wait 10 minutes before you urinate.  Slowly increase the waiting period until you are able to control when you have to urinate.  Scheduled urination  Empty your bladder when you first wake up in the morning.  Schedule times throughout the day when you will urinate.  Start by going to the bathroom every hour, even if you don't need to go.  Slowly increase the time between trips to the bathroom.  When you have found a schedule that works well for you, keep doing it.  If you wake up during the night and have to urinate, do it. Apply your schedule to waking hours only.  Kegel exercises  These tighten and strengthen pelvic muscles, which can help you control  "the flow of urine. (If doing these exercises causes pain, stop doing them and talk with your doctor.) To do Kegel exercises:  Squeeze your muscles as if you were trying not to pass gas. Or squeeze your muscles as if you were stopping the flow of urine. Your belly, legs, and buttocks shouldn't move.  Hold the squeeze for 3 seconds, then relax for 5 to 10 seconds.  Start with 3 seconds, then add 1 second each week until you are able to squeeze for 10 seconds.  Repeat the exercise 10 times a session. Do 3 to 8 sessions a day.  When should you call for help?  Watch closely for changes in your health, and be sure to contact your doctor if:    Your incontinence is getting worse.     You do not get better as expected.   Where can you learn more?  Go to https://www.[email or web address].net/patiented  Enter V684 in the search box to learn more about \"Bladder Training: Care Instructions.\"  Current as of: November 15, 2023               Content Version: 14.0    9279-8339 [employer].   Care instructions adapted under license by your healthcare professional. If you have questions about a medical condition or this instruction, always ask your healthcare professional. [employer] disclaims any warranty or liability for your use of this information.         "

## 2024-09-25 ENCOUNTER — ANCILLARY PROCEDURE (OUTPATIENT)
Dept: MAMMOGRAPHY | Facility: CLINIC | Age: 74
End: 2024-09-25
Attending: FAMILY MEDICINE
Payer: COMMERCIAL

## 2024-09-25 DIAGNOSIS — Z00.00 ENCOUNTER FOR MEDICARE ANNUAL WELLNESS EXAM: ICD-10-CM

## 2024-09-25 DIAGNOSIS — Z12.31 VISIT FOR SCREENING MAMMOGRAM: ICD-10-CM

## 2024-09-25 PROCEDURE — 77063 BREAST TOMOSYNTHESIS BI: CPT | Mod: TC | Performed by: RADIOLOGY

## 2024-09-25 PROCEDURE — 77067 SCR MAMMO BI INCL CAD: CPT | Mod: TC | Performed by: RADIOLOGY

## 2024-10-03 NOTE — TELEPHONE ENCOUNTER
Pt called the clinic along with her  wondering why Rx for simvastatin is having issues at the pharmacy. Advised that this could be because her fating lipids labs have . Pt said that she had them drawn at [email or web address] on 21. They should have been faxed to the clinic. Pt has a copy of results at home, advised to attach those to a [employer] message and send it to Dr. Villatoro to review with a message. Triage nurse not able to determine any other reason why Rx is being denied. Will route to PCP.      0

## 2025-07-22 ENCOUNTER — PATIENT OUTREACH (OUTPATIENT)
Dept: CARE COORDINATION | Facility: CLINIC | Age: 75
End: 2025-07-22
Payer: COMMERCIAL